# Patient Record
Sex: FEMALE | Race: WHITE | NOT HISPANIC OR LATINO | Employment: FULL TIME | URBAN - METROPOLITAN AREA
[De-identification: names, ages, dates, MRNs, and addresses within clinical notes are randomized per-mention and may not be internally consistent; named-entity substitution may affect disease eponyms.]

---

## 2017-07-21 ENCOUNTER — OFFICE VISIT (OUTPATIENT)
Dept: OBSTETRICS AND GYNECOLOGY | Facility: CLINIC | Age: 72
End: 2017-07-21
Payer: MEDICARE

## 2017-07-21 VITALS
BODY MASS INDEX: 25.82 KG/M2 | WEIGHT: 145.75 LBS | SYSTOLIC BLOOD PRESSURE: 106 MMHG | DIASTOLIC BLOOD PRESSURE: 68 MMHG | HEIGHT: 63 IN

## 2017-07-21 DIAGNOSIS — Z12.79 ENCOUNTER FOR SCREENING FOR MALIGNANT NEOPLASM OF OTHER GENITOURINARY ORGANS: Primary | ICD-10-CM

## 2017-07-21 PROCEDURE — G0101 CA SCREEN;PELVIC/BREAST EXAM: HCPCS | Mod: PBBFAC,PO | Performed by: OBSTETRICS & GYNECOLOGY

## 2017-07-21 PROCEDURE — 99999 PR PBB SHADOW E&M-EST. PATIENT-LVL III: CPT | Mod: PBBFAC,,, | Performed by: OBSTETRICS & GYNECOLOGY

## 2017-07-21 PROCEDURE — 88175 CYTOPATH C/V AUTO FLUID REDO: CPT

## 2017-07-21 PROCEDURE — 99213 OFFICE O/P EST LOW 20 MIN: CPT | Mod: PBBFAC,PO | Performed by: OBSTETRICS & GYNECOLOGY

## 2017-07-21 PROCEDURE — G0101 CA SCREEN;PELVIC/BREAST EXAM: HCPCS | Mod: S$PBB,,, | Performed by: OBSTETRICS & GYNECOLOGY

## 2017-07-21 NOTE — PROGRESS NOTES
"HPI:   71 y.o.   OB History      Para Term  AB Living    2 2 2          SAB TAB Ectopic Multiple Live Births                    No LMP recorded. Patient is postmenopausal.    Patient is  here for her annual gynecologic exam.  She has no complaints.     ROS:  GENERAL: No fever, chills, fatigability or weight loss.  SKIN: No rashes, itching or changes in color or texture of skin.  HEAD: No headaches or recent head trauma.  EYES: Visual acuity fine. No photophobia, ocular pain or diplopia.  EARS: Denies ear pain, discharge or vertigo.  NOSE: No loss of smell, no epistaxis or postnasal drip.  MOUTH & THROAT: No hoarseness or change in voice. No excessive gum bleeding.  NODES: Denies swollen glands.  CHEST: Denies FELIX, cyanosis, wheezing, cough and sputum production.  CARDIOVASCULAR: Denies chest pain, PND, orthopnea or reduced exercise tolerance.  ABDOMEN: Appetite fine. No weight loss. Denies diarrhea, abdominal pain, hematemesis or blood in stool.  URINARY: No flank pain, dysuria or hematuria.  PERIPHERAL VASCULAR: No claudication or cyanosis.  MUSCULOSKELETAL: No joint stiffness or swelling. Denies back pain.  NEUROLOGIC: No history of seizures, paralysis, alteration of gait or coordination.    PE:   /68   Ht 5' 3" (1.6 m)   Wt 66.1 kg (145 lb 11.6 oz)   BMI 25.81 kg/m²   APPEARANCE: Well nourished, well developed, in no acute distress.  NECK: Neck symmetric without masses or thyromegaly.  BREASTS: Symmetrical, no skin changes or visible lesions. No palpable masses, nipple discharge or adenopathy bilaterally.  ABDOMEN: Flat. Soft. No tenderness or masses. No hepatosplenomegaly. No hernias. No CVA tenderness.  VULVA: No lesions. Normal female genitalia.  URETHRAL MEATUS: Normal size and location, no lesions, no prolapse.  URETHRA: No masses, tenderness, prolapse or scarring.  VAGINA: Moist and well rugated, no discharge, no significant cystocele or rectocele.  CERVIX: No lesions and discharge. " PAP done.  UTERUS: Normal size, regular shape, mobile, non-tender, bladder base nontender.  ADNEXA: No masses, tenderness or CDS nodularity.  ANUS PERINEUM: Normal.    PROCEDURES:  Pap smear    Assessment:  Normal Gynecologic Exam    Plan:  Mammogram and Colonoscopy if indicated by current recommendations.  Return to clinic in one year or for any problems or complaints.  No co  Hx fo breast cancer and melanoma  Therefore high risk

## 2017-08-16 ENCOUNTER — OFFICE VISIT (OUTPATIENT)
Dept: DERMATOLOGY | Facility: CLINIC | Age: 72
End: 2017-08-16
Payer: MEDICARE

## 2017-08-16 DIAGNOSIS — Z85.820 PERSONAL HISTORY OF MALIGNANT MELANOMA OF SKIN: ICD-10-CM

## 2017-08-16 DIAGNOSIS — L73.8 SEBACEOUS HYPERPLASIA: ICD-10-CM

## 2017-08-16 DIAGNOSIS — L91.8 CUTANEOUS SKIN TAGS: ICD-10-CM

## 2017-08-16 DIAGNOSIS — L81.4 LENTIGINES: Primary | ICD-10-CM

## 2017-08-16 DIAGNOSIS — D18.00 ANGIOMA: ICD-10-CM

## 2017-08-16 DIAGNOSIS — D48.5 NEOPLASM OF UNCERTAIN BEHAVIOR OF SKIN: ICD-10-CM

## 2017-08-16 DIAGNOSIS — L82.1 SEBORRHEIC KERATOSES: ICD-10-CM

## 2017-08-16 PROCEDURE — 99213 OFFICE O/P EST LOW 20 MIN: CPT | Mod: PBBFAC,25 | Performed by: DERMATOLOGY

## 2017-08-16 PROCEDURE — 11100 PR BIOPSY OF SKIN LESION: CPT | Mod: S$PBB,,, | Performed by: DERMATOLOGY

## 2017-08-16 PROCEDURE — 1159F MED LIST DOCD IN RCRD: CPT | Mod: ,,, | Performed by: DERMATOLOGY

## 2017-08-16 PROCEDURE — 1126F AMNT PAIN NOTED NONE PRSNT: CPT | Mod: ,,, | Performed by: DERMATOLOGY

## 2017-08-16 PROCEDURE — 99203 OFFICE O/P NEW LOW 30 MIN: CPT | Mod: 25,S$PBB,, | Performed by: DERMATOLOGY

## 2017-08-16 PROCEDURE — 88342 IMHCHEM/IMCYTCHM 1ST ANTB: CPT | Mod: 26,,, | Performed by: PATHOLOGY

## 2017-08-16 PROCEDURE — 3008F BODY MASS INDEX DOCD: CPT | Mod: ,,, | Performed by: DERMATOLOGY

## 2017-08-16 PROCEDURE — 11100 PR BIOPSY OF SKIN LESION: CPT | Mod: PBBFAC | Performed by: DERMATOLOGY

## 2017-08-16 PROCEDURE — 88305 TISSUE EXAM BY PATHOLOGIST: CPT | Performed by: PATHOLOGY

## 2017-08-16 PROCEDURE — 99999 PR PBB SHADOW E&M-EST. PATIENT-LVL III: CPT | Mod: PBBFAC,,, | Performed by: DERMATOLOGY

## 2017-08-16 NOTE — PROGRESS NOTES
Subjective:       Patient ID:  Alyce Lewis is a 72 y.o. female who presents for   Chief Complaint   Patient presents with    Skin Check     TBSE, no concerns     HPI  71 yo F presents for skin check.  She noticed a new bump on her R arm.  She is unsure how long it has been present.  It has been stable in size, denies bleeding.  No prior treatments  Derm Hx: melanoma on R arm, LN negative (5 years ago in [place]);   Breast CA surgery 10 years ago; lumpectomy and XRT    Past Medical History:   Diagnosis Date    Breast cancer     Hyperlipidemia     Melanoma     on arm    Migraine    Social Hx: retired teacher    Review of Systems   Constitutional: Negative for fever, chills, weight loss, weight gain, fatigue and malaise.   Skin: Positive for activity-related sunscreen use. Negative for daily sunscreen use and recent sunburn.   Hematologic/Lymphatic: Does not bruise/bleed easily.        Objective:    Physical Exam   Constitutional: She appears well-developed and well-nourished. No distress.   Neurological: She is alert and oriented to person, place, and time. She is not disoriented.   Psychiatric: She has a normal mood and affect.   Skin:   Areas Examined (abnormalities noted in diagram):   Scalp / Hair Palpated and Inspected  Head / Face Inspection Performed  Neck Inspection Performed  Chest / Axilla Inspection Performed  Abdomen Inspection Performed  Genitals / Buttocks / Groin Inspection Performed  Back Inspection Performed  RUE Inspected  LUE Inspection Performed  RLE Inspected  LLE Inspection Performed  Nails and Digits Inspection Performed                       Diagram Legend     Erythematous scaling macule/papule c/w actinic keratosis       Vascular papule c/w angioma      Pigmented verrucoid papule/plaque c/w seborrheic keratosis      Yellow umbilicated papule c/w sebaceous hyperplasia      Irregularly shaped tan macule c/w lentigo     1-2 mm smooth white papules consistent with Milia      Movable  subcutaneous cyst with punctum c/w epidermal inclusion cyst      Subcutaneous movable cyst c/w pilar cyst      Firm pink to brown papule c/w dermatofibroma      Pedunculated fleshy papule(s) c/w skin tag(s)      Evenly pigmented macule c/w junctional nevus     Mildly variegated pigmented, slightly irregular-bordered macule c/w mildly atypical nevus      Flesh colored to evenly pigmented papule c/w intradermal nevus       Pink pearly papule/plaque c/w basal cell carcinoma      Erythematous hyperkeratotic cursted plaque c/w SCC      Surgical scar with no sign of skin cancer recurrence      Open and closed comedones      Inflammatory papules and pustules      Verrucoid papule consistent consistent with wart     Erythematous eczematous patches and plaques     Dystrophic onycholytic nail with subungual debris c/w onychomycosis     Umbilicated papule    Erythematous-base heme-crusted tan verrucoid plaque consistent with inflamed seborrheic keratosis     Erythematous Silvery Scaling Plaque c/w Psoriasis     See annotation      Assessment / Plan:      Pathology Orders:      Normal Orders This Visit    Tissue Specimen To Pathology, Dermatology     Questions:    Directional Terms:  Other(comment)    Clinical information:  r/o NMSC vs inflammatory papule vs irritated nevus vs other    Specific Site:  R upper arm        Lentigines  These are benign hyperpigmented sun induced lesions. Daily sun protection will reduce the number of new lesions    Angioma  This is a benign vascular lesion. Reassurance given. No treatment required.     Cutaneous skin tags  Reassurance    Neoplasm of uncertain behavior of skin  -     Tissue Specimen To Pathology, Dermatology  Shave biopsy procedure note:    Shave biopsy performed after verbal consent including risk of infection, scar, recurrence, need for additional treatment of site. Area prepped with alcohol, anesthetized with approximately 1.0cc of 1% lidocaine with epinephrine. Lesional tissue  shaved with razor blade. Hemostasis achieved with application of aluminum chloride. No complications. Dressing applied. Wound care explained.    Personal history of malignant melanoma of skin  Area of previous melanoma examined. Site well healed with no signs of recurrence.  Total body skin examination performed today including at least 12 points as noted in physical examination    Seborrheic keratoses  These are benign inherited growths without a malignant potential. Reassurance given to patient. No treatment is necessary.     Sebaceous hyperplasia  This is a common condition representing benign enlargement of the sebaceous lobule. It typically occurs in adulthood. Reassurance given to patient.              Return for pending Pathology.

## 2018-03-01 ENCOUNTER — OFFICE VISIT (OUTPATIENT)
Dept: INTERNAL MEDICINE | Facility: CLINIC | Age: 73
End: 2018-03-01
Payer: MEDICARE

## 2018-03-01 VITALS
RESPIRATION RATE: 15 BRPM | SYSTOLIC BLOOD PRESSURE: 124 MMHG | WEIGHT: 149.94 LBS | TEMPERATURE: 99 F | HEART RATE: 90 BPM | DIASTOLIC BLOOD PRESSURE: 76 MMHG | BODY MASS INDEX: 26.56 KG/M2

## 2018-03-01 DIAGNOSIS — Z23 NEED FOR SHINGLES VACCINE: ICD-10-CM

## 2018-03-01 DIAGNOSIS — N32.81 OVERACTIVE BLADDER: ICD-10-CM

## 2018-03-01 DIAGNOSIS — E78.5 HYPERLIPIDEMIA, UNSPECIFIED HYPERLIPIDEMIA TYPE: Primary | ICD-10-CM

## 2018-03-01 PROCEDURE — 99204 OFFICE O/P NEW MOD 45 MIN: CPT | Mod: S$PBB,,, | Performed by: INTERNAL MEDICINE

## 2018-03-01 PROCEDURE — 99999 PR PBB SHADOW E&M-EST. PATIENT-LVL III: CPT | Mod: PBBFAC,,, | Performed by: INTERNAL MEDICINE

## 2018-03-01 PROCEDURE — 99213 OFFICE O/P EST LOW 20 MIN: CPT | Mod: PBBFAC,PO | Performed by: INTERNAL MEDICINE

## 2018-03-01 RX ORDER — METOPROLOL SUCCINATE 25 MG/1
25 TABLET, EXTENDED RELEASE ORAL DAILY
Qty: 30 TABLET | Refills: 11 | Status: SHIPPED | OUTPATIENT
Start: 2018-03-01 | End: 2019-02-24 | Stop reason: SDUPTHER

## 2018-03-01 NOTE — PROGRESS NOTES
Subjective:       Patient ID: Alyce Lewis is a 72 y.o. female.    Chief Complaint: Annual Exam and Establish Care    Patient is a 72 y.o.female who presents today for establish care.      Cholesterol: due now  Vaccines: pneumonia shot up to date; flu shot up to date  Mammogram: up to date; usually   Gyn exam: dr. easley  Colonoscopy: had one in the last 1-2 years  DEXA: normal     Past Medical History:   Diagnosis Date    Breast cancer     2010; lumpectomy and radiation; dr. condon    Hyperlipidemia     Melanoma     on arm; right arm; dr. jennie nolasco    Migraine     uses maxalt disintegrating;      Past Surgical History:   Procedure Laterality Date    BREAST LUMPECTOMY      carpel tunnel release      HAND SURGERY      melanoma removal      right arm    TONSILLECTOMY       Social History     Social History    Marital status:      Spouse name: N/A    Number of children: N/A    Years of education: N/A     Occupational History    Not on file.     Social History Main Topics    Smoking status: Never Smoker    Smokeless tobacco: Never Used    Alcohol use No    Drug use: No    Sexual activity: Not on file     Other Topics Concern    Not on file     Social History Narrative    No narrative on file     Review of patient's allergies indicates:   Allergen Reactions    Penicillins Hives    Demerol [meperidine] Nausea And Vomiting    Sulfa (sulfonamide antibiotics) Hives     Ms. Lewis had no medications administered during this visit.    Review of Systems   Constitutional: Negative for appetite change, chills, diaphoresis, fatigue and fever.   HENT: Negative for congestion, dental problem, ear discharge, ear pain, hearing loss, postnasal drip, sinus pressure and sore throat.    Eyes: Negative for discharge, redness and itching.   Respiratory: Negative for cough, chest tightness, shortness of breath and wheezing.    Cardiovascular: Negative for chest pain, palpitations and leg swelling.    Gastrointestinal: Negative for abdominal pain, constipation, diarrhea, nausea and vomiting.   Endocrine: Negative for cold intolerance and heat intolerance.   Genitourinary: Negative for difficulty urinating, frequency, hematuria and urgency.   Musculoskeletal: Negative for arthralgias, back pain, gait problem, myalgias and neck pain.   Skin: Negative for color change and rash.   Neurological: Negative for dizziness, syncope and headaches.   Hematological: Negative for adenopathy.   Psychiatric/Behavioral: Negative for behavioral problems and sleep disturbance. The patient is not nervous/anxious.        Objective:      Physical Exam   Constitutional: She is oriented to person, place, and time. She appears well-developed and well-nourished. No distress.   HENT:   Head: Normocephalic and atraumatic.   Right Ear: External ear normal.   Left Ear: External ear normal.   Nose: Nose normal.   Mouth/Throat: Oropharynx is clear and moist. No oropharyngeal exudate.   Eyes: Conjunctivae and EOM are normal. Pupils are equal, round, and reactive to light. Right eye exhibits no discharge. Left eye exhibits no discharge. No scleral icterus.   Neck: Normal range of motion. Neck supple. No JVD present. No thyromegaly present.   Cardiovascular: Normal rate, regular rhythm, normal heart sounds and intact distal pulses.  Exam reveals no gallop and no friction rub.    No murmur heard.  Pulmonary/Chest: Effort normal and breath sounds normal. No stridor. No respiratory distress. She has no wheezes. She has no rales. She exhibits no tenderness.   Abdominal: Soft. Bowel sounds are normal. She exhibits no distension. There is no tenderness. There is no rebound.   Musculoskeletal: Normal range of motion. She exhibits no edema or tenderness.   Lymphadenopathy:     She has no cervical adenopathy.   Neurological: She is alert and oriented to person, place, and time. No cranial nerve deficit.   Skin: Skin is warm and dry. No rash noted. She is  not diaphoretic. No erythema.   Psychiatric: She has a normal mood and affect. Her behavior is normal.   Nursing note and vitals reviewed.      Assessment and Plan:       1. Hyperlipidemia, unspecified hyperlipidemia type  - CBC auto differential; Future  - Comprehensive metabolic panel; Future  - Lipid panel; Future  - TSH; Future  - Urinalysis; Future    2. Overactive bladder  - stable  - CBC auto differential; Future  - Comprehensive metabolic panel; Future  - Lipid panel; Future  - TSH; Future  - Urinalysis; Future    3. Need for shingles vaccine    - zoster vaccine live, PF, (ZOSTAVAX) 19,400 unit/0.65 mL injection; Inject 19,400 Units into the skin once. Zostavax x 1  v70.0  Dispense: 1 vial; Refill: 0  - CBC auto differential; Future  - Comprehensive metabolic panel; Future  - Lipid panel; Future  - TSH; Future  - Urinalysis; Future          No Follow-up on file.

## 2018-03-03 ENCOUNTER — LAB VISIT (OUTPATIENT)
Dept: LAB | Facility: HOSPITAL | Age: 73
End: 2018-03-03
Attending: INTERNAL MEDICINE
Payer: MEDICARE

## 2018-03-03 DIAGNOSIS — N32.81 OVERACTIVE BLADDER: ICD-10-CM

## 2018-03-03 DIAGNOSIS — E78.5 HYPERLIPIDEMIA, UNSPECIFIED HYPERLIPIDEMIA TYPE: ICD-10-CM

## 2018-03-03 DIAGNOSIS — Z23 NEED FOR SHINGLES VACCINE: ICD-10-CM

## 2018-03-03 LAB
ALBUMIN SERPL BCP-MCNC: 3.7 G/DL
ALP SERPL-CCNC: 59 U/L
ALT SERPL W/O P-5'-P-CCNC: 14 U/L
ANION GAP SERPL CALC-SCNC: 8 MMOL/L
AST SERPL-CCNC: 16 U/L
BASOPHILS # BLD AUTO: 0.06 K/UL
BASOPHILS NFR BLD: 1.2 %
BILIRUB SERPL-MCNC: 0.3 MG/DL
BUN SERPL-MCNC: 12 MG/DL
CALCIUM SERPL-MCNC: 9.6 MG/DL
CHLORIDE SERPL-SCNC: 105 MMOL/L
CHOLEST SERPL-MCNC: 181 MG/DL
CHOLEST/HDLC SERPL: 3.3 {RATIO}
CO2 SERPL-SCNC: 26 MMOL/L
CREAT SERPL-MCNC: 0.7 MG/DL
DIFFERENTIAL METHOD: ABNORMAL
EOSINOPHIL # BLD AUTO: 0.2 K/UL
EOSINOPHIL NFR BLD: 4.4 %
ERYTHROCYTE [DISTWIDTH] IN BLOOD BY AUTOMATED COUNT: 13.1 %
EST. GFR  (AFRICAN AMERICAN): >60 ML/MIN/1.73 M^2
EST. GFR  (NON AFRICAN AMERICAN): >60 ML/MIN/1.73 M^2
GLUCOSE SERPL-MCNC: 91 MG/DL
HCT VFR BLD AUTO: 38.9 %
HDLC SERPL-MCNC: 55 MG/DL
HDLC SERPL: 30.4 %
HGB BLD-MCNC: 12.4 G/DL
IMM GRANULOCYTES # BLD AUTO: 0.01 K/UL
IMM GRANULOCYTES NFR BLD AUTO: 0.2 %
LDLC SERPL CALC-MCNC: 112.2 MG/DL
LYMPHOCYTES # BLD AUTO: 1.4 K/UL
LYMPHOCYTES NFR BLD: 28.3 %
MCH RBC QN AUTO: 29.5 PG
MCHC RBC AUTO-ENTMCNC: 31.9 G/DL
MCV RBC AUTO: 93 FL
MONOCYTES # BLD AUTO: 0.5 K/UL
MONOCYTES NFR BLD: 10.3 %
NEUTROPHILS # BLD AUTO: 2.8 K/UL
NEUTROPHILS NFR BLD: 55.6 %
NONHDLC SERPL-MCNC: 126 MG/DL
NRBC BLD-RTO: 0 /100 WBC
PLATELET # BLD AUTO: 243 K/UL
PMV BLD AUTO: 10.8 FL
POTASSIUM SERPL-SCNC: 4 MMOL/L
PROT SERPL-MCNC: 6.8 G/DL
RBC # BLD AUTO: 4.2 M/UL
SODIUM SERPL-SCNC: 139 MMOL/L
TRIGL SERPL-MCNC: 69 MG/DL
TSH SERPL DL<=0.005 MIU/L-ACNC: 1 UIU/ML
WBC # BLD AUTO: 5.05 K/UL

## 2018-03-03 PROCEDURE — 80061 LIPID PANEL: CPT

## 2018-03-03 PROCEDURE — 36415 COLL VENOUS BLD VENIPUNCTURE: CPT | Mod: PO

## 2018-03-03 PROCEDURE — 84443 ASSAY THYROID STIM HORMONE: CPT

## 2018-03-03 PROCEDURE — 80053 COMPREHEN METABOLIC PANEL: CPT

## 2018-03-03 PROCEDURE — 85025 COMPLETE CBC W/AUTO DIFF WBC: CPT

## 2018-05-06 RX ORDER — SIMVASTATIN 40 MG/1
TABLET, FILM COATED ORAL
Qty: 30 TABLET | Refills: 0 | Status: SHIPPED | OUTPATIENT
Start: 2018-05-06 | End: 2018-06-01 | Stop reason: SDUPTHER

## 2018-05-06 RX ORDER — SIMVASTATIN 40 MG/1
TABLET, FILM COATED ORAL
Qty: 10 TABLET | Refills: 0 | Status: SHIPPED | OUTPATIENT
Start: 2018-05-06 | End: 2018-05-06 | Stop reason: SDUPTHER

## 2018-06-01 RX ORDER — RIZATRIPTAN BENZOATE 10 MG/1
TABLET, ORALLY DISINTEGRATING ORAL
Qty: 15 TABLET | Refills: 0 | Status: SHIPPED | OUTPATIENT
Start: 2018-06-01 | End: 2018-06-14 | Stop reason: SDUPTHER

## 2018-06-01 RX ORDER — CYCLOBENZAPRINE HCL 10 MG
TABLET ORAL
Qty: 10 TABLET | Refills: 0 | Status: SHIPPED | OUTPATIENT
Start: 2018-06-01 | End: 2018-07-25 | Stop reason: SDUPTHER

## 2018-06-01 RX ORDER — SIMVASTATIN 40 MG/1
TABLET, FILM COATED ORAL
Qty: 30 TABLET | Refills: 0 | Status: SHIPPED | OUTPATIENT
Start: 2018-06-01 | End: 2018-06-14 | Stop reason: SDUPTHER

## 2018-06-01 RX ORDER — BUSPIRONE HYDROCHLORIDE 15 MG/1
TABLET ORAL
Qty: 60 TABLET | Refills: 0 | Status: SHIPPED | OUTPATIENT
Start: 2018-06-01 | End: 2018-06-14

## 2018-06-01 RX ORDER — BUSPIRONE HYDROCHLORIDE 15 MG/1
TABLET ORAL
Qty: 60 TABLET | Refills: 0 | Status: SHIPPED | OUTPATIENT
Start: 2018-06-01 | End: 2018-06-14 | Stop reason: SDUPTHER

## 2018-06-08 DIAGNOSIS — Z12.11 COLON CANCER SCREENING: ICD-10-CM

## 2018-06-08 DIAGNOSIS — Z12.39 BREAST CANCER SCREENING: ICD-10-CM

## 2018-06-14 ENCOUNTER — OFFICE VISIT (OUTPATIENT)
Dept: INTERNAL MEDICINE | Facility: CLINIC | Age: 73
End: 2018-06-14
Payer: MEDICARE

## 2018-06-14 VITALS
BODY MASS INDEX: 26.95 KG/M2 | WEIGHT: 152.13 LBS | HEART RATE: 109 BPM | HEIGHT: 63 IN | TEMPERATURE: 97 F | DIASTOLIC BLOOD PRESSURE: 73 MMHG | RESPIRATION RATE: 14 BRPM | SYSTOLIC BLOOD PRESSURE: 113 MMHG

## 2018-06-14 DIAGNOSIS — F41.9 ANXIETY: Chronic | ICD-10-CM

## 2018-06-14 DIAGNOSIS — G43.909 MIGRAINE SYNDROME: ICD-10-CM

## 2018-06-14 DIAGNOSIS — E78.5 HYPERLIPIDEMIA, UNSPECIFIED HYPERLIPIDEMIA TYPE: ICD-10-CM

## 2018-06-14 DIAGNOSIS — I10 ESSENTIAL HYPERTENSION: Primary | ICD-10-CM

## 2018-06-14 PROCEDURE — 99214 OFFICE O/P EST MOD 30 MIN: CPT | Mod: S$PBB,,, | Performed by: INTERNAL MEDICINE

## 2018-06-14 PROCEDURE — 99213 OFFICE O/P EST LOW 20 MIN: CPT | Mod: PBBFAC,PO | Performed by: INTERNAL MEDICINE

## 2018-06-14 PROCEDURE — 99999 PR PBB SHADOW E&M-EST. PATIENT-LVL III: CPT | Mod: PBBFAC,,, | Performed by: INTERNAL MEDICINE

## 2018-06-14 RX ORDER — RIZATRIPTAN BENZOATE 10 MG/1
TABLET, ORALLY DISINTEGRATING ORAL
Qty: 15 TABLET | Refills: 3 | Status: SHIPPED | OUTPATIENT
Start: 2018-06-14 | End: 2018-06-14 | Stop reason: SDUPTHER

## 2018-06-14 RX ORDER — SIMVASTATIN 40 MG/1
TABLET, FILM COATED ORAL
Qty: 30 TABLET | Refills: 3 | Status: SHIPPED | OUTPATIENT
Start: 2018-06-14 | End: 2018-07-18 | Stop reason: SDUPTHER

## 2018-06-14 RX ORDER — BUSPIRONE HYDROCHLORIDE 15 MG/1
15 TABLET ORAL 2 TIMES DAILY
Qty: 60 TABLET | Refills: 3 | Status: SHIPPED | OUTPATIENT
Start: 2018-06-14 | End: 2018-07-10

## 2018-06-14 RX ORDER — RIZATRIPTAN BENZOATE 10 MG/1
TABLET, ORALLY DISINTEGRATING ORAL
Qty: 15 TABLET | Refills: 3 | Status: SHIPPED | OUTPATIENT
Start: 2018-06-14 | End: 2018-07-25 | Stop reason: SDUPTHER

## 2018-06-14 NOTE — PROGRESS NOTES
Subjective:       Patient ID: Alyce Lewis is a 72 y.o. female.    Chief Complaint: Follow-up    HPI     72-year-old female here for follow-up of chronic medical conditions.    HTN -  Patient is currently on Toprol-XL 25 mg. She does not check her BP at home. Side effects of medications note: none. Denies headaches, blurred vision, chest pain, shortness of breath, nausea.    HLD - Patient is currently on Zocor 40 mg.  Her last lipid panel was   Cholesterol   Date Value Ref Range Status   03/03/2018 181 120 - 199 mg/dL Final     Comment:     The National Cholesterol Education Program (NCEP) has set the  following guidelines (reference ranges) for Cholesterol:  Optimal.....................<200 mg/dL  Borderline High.............200-239 mg/dL  High........................> or = 240 mg/dL       Triglycerides   Date Value Ref Range Status   03/03/2018 69 30 - 150 mg/dL Final     Comment:     The National Cholesterol Education Program (NCEP) has set the  following guidelines (reference values) for triglycerides:  Normal......................<150 mg/dL  Borderline High.............150-199 mg/dL  High........................200-499 mg/dL       HDL   Date Value Ref Range Status   03/03/2018 55 40 - 75 mg/dL Final     Comment:     The National Cholesterol Education Program (NCEP) has set the  following guidelines (reference values) for HDL Cholesterol:  Low...............<40 mg/dL  Optimal...........>60 mg/dL       LDL Cholesterol   Date Value Ref Range Status   03/03/2018 112.2 63.0 - 159.0 mg/dL Final     Comment:     The National Cholesterol Education Program (NCEP) has set the  following guidelines (reference values) for LDL Cholesterol:  Optimal.......................<130 mg/dL  Borderline High...............130-159 mg/dL  High..........................160-189 mg/dL  Very High.....................>190 mg/dL     .  Side effects of the medication: none.    Anxiety - on BuSpar 15 mg b.i.d., Zoloft 100 mg daily.      She  has migraines.  She has been having migraines since 18 yo.  These are not as bad as she was when she was 18 yo.  She reports that with the maxalt, the headaches resolve in 20 minutes instead of three days.  She has used imitrex in the past.  She has used injectable imitrex.  The imitrex stopped working for a while.  She has taken fioricet, but was dependent on this, so stopped it. She has tried other medications that were ineffective.    Review of Systems    Objective:      Physical Exam   Constitutional: She is oriented to person, place, and time. She appears well-developed and well-nourished.   HENT:   Head: Normocephalic and atraumatic.   Mouth/Throat: No oropharyngeal exudate.   Eyes: EOM are normal. Pupils are equal, round, and reactive to light. Right eye exhibits no discharge. Left eye exhibits no discharge. No scleral icterus.   Neck: Normal range of motion. Neck supple. No tracheal deviation present. No thyromegaly present.   Cardiovascular: Normal rate, regular rhythm and normal heart sounds.  Exam reveals no gallop and no friction rub.    No murmur heard.  Pulmonary/Chest: Effort normal and breath sounds normal. No respiratory distress. She has no wheezes. She has no rales. She exhibits no tenderness.   Abdominal: Soft. Bowel sounds are normal. She exhibits no distension and no mass. There is no tenderness. There is no rebound and no guarding.   Musculoskeletal: Normal range of motion. She exhibits no edema or tenderness.   Neurological: She is alert and oriented to person, place, and time.   Skin: Skin is warm and dry. No rash noted. No erythema. No pallor.   Psychiatric: She has a normal mood and affect. Her behavior is normal.   Vitals reviewed.      Assessment:       1. Essential hypertension    2. Hyperlipidemia, unspecified hyperlipidemia type    3. Anxiety    4. Migraine syndrome        Plan:       1.  Continue Toprol-XL 25 mg.  2.  Continue Zocor 40 mg.  3.  Continue BuSpar 15 mg b.i.d., Zoloft  100 mg daily.  4.  Continue Maxalt 10 mg as needed.

## 2018-07-10 RX ORDER — BUSPIRONE HYDROCHLORIDE 15 MG/1
TABLET ORAL
Qty: 60 TABLET | Refills: 6 | Status: SHIPPED | OUTPATIENT
Start: 2018-07-10 | End: 2018-07-25 | Stop reason: SDUPTHER

## 2018-07-18 RX ORDER — SIMVASTATIN 40 MG/1
TABLET, FILM COATED ORAL
Qty: 30 TABLET | Refills: 5 | Status: SHIPPED | OUTPATIENT
Start: 2018-07-18 | End: 2019-01-12 | Stop reason: SDUPTHER

## 2018-07-18 RX ORDER — SIMVASTATIN 40 MG/1
TABLET, FILM COATED ORAL
Qty: 30 TABLET | Refills: 0 | OUTPATIENT
Start: 2018-07-18

## 2018-07-24 NOTE — PROGRESS NOTES
Subjective:       Patient ID: Alyce Lewis is a 72 y.o. female.    Chief Complaint: Follow-up (medication)    Patient is a 72 y.o.female who presents today for follow up.      Vaccines: pneumonia shot up to date; flu shot up to date  Mammogram:  Due in august; ordered by gyn  Gyn exam: dr. easley  Colonoscopy: had one in the last 2 years  DEXA: normal       Migraines: well controlled with rizatriptan; tried imitrex in the past but it stopped working. Same thing occurred with fioricet. She tried depakote in the past as well but it never worked well either.     Flexeril: pt is taking it once per day for her muscle aches; she has a disc bulge in her neck. The flexeril prevents her from getting trapezius muscle spasms for the disc bulge.   Review of Systems   Constitutional: Negative for appetite change, chills, diaphoresis, fatigue and fever.   HENT: Negative for congestion, dental problem, ear discharge, ear pain, hearing loss, postnasal drip, sinus pressure and sore throat.    Eyes: Negative for discharge, redness and itching.   Respiratory: Negative for cough, chest tightness, shortness of breath and wheezing.    Cardiovascular: Negative for chest pain, palpitations and leg swelling.   Gastrointestinal: Negative for abdominal pain, constipation, diarrhea, nausea and vomiting.   Endocrine: Negative for cold intolerance and heat intolerance.   Genitourinary: Negative for difficulty urinating, frequency, hematuria and urgency.   Musculoskeletal: Negative for arthralgias, back pain, gait problem, myalgias and neck pain.   Skin: Negative for color change and rash.   Neurological: Negative for dizziness, syncope and headaches.   Hematological: Negative for adenopathy.   Psychiatric/Behavioral: Negative for behavioral problems and sleep disturbance. The patient is not nervous/anxious.        Objective:      Physical Exam   Constitutional: She is oriented to person, place, and time. She appears well-developed and  well-nourished. No distress.   HENT:   Head: Normocephalic and atraumatic.   Right Ear: External ear normal.   Left Ear: External ear normal.   Nose: Nose normal.   Mouth/Throat: Oropharynx is clear and moist. No oropharyngeal exudate.   Eyes: Conjunctivae and EOM are normal. Pupils are equal, round, and reactive to light. Right eye exhibits no discharge. Left eye exhibits no discharge. No scleral icterus.   Neck: Normal range of motion. Neck supple. No JVD present. No thyromegaly present.   Cardiovascular: Normal rate, regular rhythm, normal heart sounds and intact distal pulses.  Exam reveals no gallop and no friction rub.    No murmur heard.  Pulmonary/Chest: Effort normal and breath sounds normal. No stridor. No respiratory distress. She has no wheezes. She has no rales. She exhibits no tenderness.   Abdominal: Soft. Bowel sounds are normal. She exhibits no distension. There is no tenderness. There is no rebound.   Musculoskeletal: Normal range of motion. She exhibits no edema or tenderness.   Lymphadenopathy:     She has no cervical adenopathy.   Neurological: She is alert and oriented to person, place, and time. No cranial nerve deficit.   Skin: Skin is warm and dry. No rash noted. She is not diaphoretic. No erythema.   Psychiatric: She has a normal mood and affect. Her behavior is normal.   Nursing note and vitals reviewed.      Assessment and Plan:       1. Hyperlipidemia, unspecified hyperlipidemia type  - stable    2. Essential hypertension  - good control on meds    3. Anxiety  - stable    4. Migraine syndrome  - maxalt refilled    5. Neck pain: continue flexeril prn        No Follow-up on file.

## 2018-07-25 ENCOUNTER — OFFICE VISIT (OUTPATIENT)
Dept: INTERNAL MEDICINE | Facility: CLINIC | Age: 73
End: 2018-07-25
Payer: MEDICARE

## 2018-07-25 VITALS
WEIGHT: 152.75 LBS | BODY MASS INDEX: 27.06 KG/M2 | DIASTOLIC BLOOD PRESSURE: 80 MMHG | HEART RATE: 90 BPM | TEMPERATURE: 99 F | RESPIRATION RATE: 15 BRPM | SYSTOLIC BLOOD PRESSURE: 114 MMHG

## 2018-07-25 DIAGNOSIS — G43.909 MIGRAINE SYNDROME: ICD-10-CM

## 2018-07-25 DIAGNOSIS — M54.2 NECK PAIN: ICD-10-CM

## 2018-07-25 DIAGNOSIS — I10 ESSENTIAL HYPERTENSION: Chronic | ICD-10-CM

## 2018-07-25 DIAGNOSIS — F41.9 ANXIETY: Chronic | ICD-10-CM

## 2018-07-25 DIAGNOSIS — E78.5 HYPERLIPIDEMIA, UNSPECIFIED HYPERLIPIDEMIA TYPE: Primary | Chronic | ICD-10-CM

## 2018-07-25 PROCEDURE — 99214 OFFICE O/P EST MOD 30 MIN: CPT | Mod: S$PBB,,, | Performed by: INTERNAL MEDICINE

## 2018-07-25 PROCEDURE — 99213 OFFICE O/P EST LOW 20 MIN: CPT | Mod: PBBFAC,PO | Performed by: INTERNAL MEDICINE

## 2018-07-25 PROCEDURE — 99999 PR PBB SHADOW E&M-EST. PATIENT-LVL III: CPT | Mod: PBBFAC,,, | Performed by: INTERNAL MEDICINE

## 2018-07-25 RX ORDER — CYCLOBENZAPRINE HCL 10 MG
TABLET ORAL
Qty: 30 TABLET | Refills: 6 | Status: SHIPPED | OUTPATIENT
Start: 2018-07-25 | End: 2019-01-27 | Stop reason: SDUPTHER

## 2018-07-25 RX ORDER — BUSPIRONE HYDROCHLORIDE 15 MG/1
TABLET ORAL
Qty: 60 TABLET | Refills: 6 | Status: SHIPPED | OUTPATIENT
Start: 2018-07-25 | End: 2019-08-05 | Stop reason: SDUPTHER

## 2018-07-25 RX ORDER — RIZATRIPTAN BENZOATE 10 MG/1
TABLET, ORALLY DISINTEGRATING ORAL
Qty: 15 TABLET | Refills: 11 | Status: SHIPPED | OUTPATIENT
Start: 2018-07-25 | End: 2019-07-27 | Stop reason: SDUPTHER

## 2018-07-31 ENCOUNTER — TELEPHONE (OUTPATIENT)
Dept: OBSTETRICS AND GYNECOLOGY | Facility: CLINIC | Age: 73
End: 2018-07-31

## 2018-07-31 NOTE — TELEPHONE ENCOUNTER
----- Message from Alis Chung sent at 7/31/2018  9:28 AM CDT -----  Contact: Self/ [phone]  Patient requesting orders for a mammogram. Patient would like [facility] to [facility].     Fax# [phone]    Please call and advise.

## 2018-08-09 RX ORDER — OXYBUTYNIN CHLORIDE 10 MG/1
TABLET, EXTENDED RELEASE ORAL
Qty: 30 TABLET | Refills: 11 | Status: SHIPPED | OUTPATIENT
Start: 2018-08-09 | End: 2019-09-02 | Stop reason: SDUPTHER

## 2018-08-09 RX ORDER — FLUTICASONE PROPIONATE 50 MCG
SPRAY, SUSPENSION (ML) NASAL
Qty: 16 ML | Refills: 11 | Status: SHIPPED | OUTPATIENT
Start: 2018-08-09 | End: 2020-04-17 | Stop reason: SDUPTHER

## 2018-08-15 ENCOUNTER — TELEPHONE (OUTPATIENT)
Dept: OTOLARYNGOLOGY | Facility: CLINIC | Age: 73
End: 2018-08-15

## 2018-08-15 ENCOUNTER — PATIENT MESSAGE (OUTPATIENT)
Dept: OBSTETRICS AND GYNECOLOGY | Facility: CLINIC | Age: 73
End: 2018-08-15

## 2018-08-15 NOTE — TELEPHONE ENCOUNTER
----- Message from Terrence Jose sent at 8/15/2018 10:39 AM CDT -----  Contact: [phone]  Patient Requesting Sooner Appointment.     Reason for sooner appt.: double ear blockage  When is the first available appointment? 10/5  Communication Preference: [phone]  Additional Information:

## 2018-08-16 ENCOUNTER — PATIENT MESSAGE (OUTPATIENT)
Dept: OBSTETRICS AND GYNECOLOGY | Facility: CLINIC | Age: 73
End: 2018-08-16

## 2018-08-20 ENCOUNTER — OFFICE VISIT (OUTPATIENT)
Dept: OBSTETRICS AND GYNECOLOGY | Facility: CLINIC | Age: 73
End: 2018-08-20
Payer: MEDICARE

## 2018-08-20 ENCOUNTER — TELEPHONE (OUTPATIENT)
Dept: INTERNAL MEDICINE | Facility: CLINIC | Age: 73
End: 2018-08-20

## 2018-08-20 VITALS
HEIGHT: 63 IN | WEIGHT: 150.38 LBS | DIASTOLIC BLOOD PRESSURE: 78 MMHG | BODY MASS INDEX: 26.64 KG/M2 | SYSTOLIC BLOOD PRESSURE: 122 MMHG

## 2018-08-20 DIAGNOSIS — Z12.4 ROUTINE PAPANICOLAOU SMEAR: ICD-10-CM

## 2018-08-20 DIAGNOSIS — Z01.419 WELL WOMAN EXAM WITH ROUTINE GYNECOLOGICAL EXAM: Primary | ICD-10-CM

## 2018-08-20 PROCEDURE — 88175 CYTOPATH C/V AUTO FLUID REDO: CPT

## 2018-08-20 PROCEDURE — 99213 OFFICE O/P EST LOW 20 MIN: CPT | Mod: PBBFAC,PO,25 | Performed by: OBSTETRICS & GYNECOLOGY

## 2018-08-20 PROCEDURE — G0101 CA SCREEN;PELVIC/BREAST EXAM: HCPCS | Mod: S$PBB,,, | Performed by: OBSTETRICS & GYNECOLOGY

## 2018-08-20 PROCEDURE — G0101 CA SCREEN;PELVIC/BREAST EXAM: HCPCS | Mod: PBBFAC,PO | Performed by: OBSTETRICS & GYNECOLOGY

## 2018-08-20 PROCEDURE — 99999 PR PBB SHADOW E&M-EST. PATIENT-LVL III: CPT | Mod: PBBFAC,,, | Performed by: OBSTETRICS & GYNECOLOGY

## 2018-08-20 NOTE — TELEPHONE ENCOUNTER
----- Message from Laly Agudelo sent at 8/20/2018  2:30 PM CDT -----  Contact: Kendal/Maliha/[phone]  Kendal would like to get a prior authorization for script cyclobenzaprine (FLEXERIL) 10 MG tablet, Kendal will fax over the prior authorization form.

## 2018-08-20 NOTE — PROGRESS NOTES
"HPI:   73 y.o.   OB History      Para Term  AB Living    2 2 2          SAB TAB Ectopic Multiple Live Births                    No LMP recorded. Patient is postmenopausal.    Patient is  here for her annual gynecologic exam.  She has no complaints.     ROS:  GENERAL: No fever, chills, fatigability or weight loss.  SKIN: No rashes, itching or changes in color or texture of skin.  HEAD: No headaches or recent head trauma.  EYES: Visual acuity fine. No photophobia, ocular pain or diplopia.  EARS: Denies ear pain, discharge or vertigo.  NOSE: No loss of smell, no epistaxis or postnasal drip.  MOUTH & THROAT: No hoarseness or change in voice. No excessive gum bleeding.  NODES: Denies swollen glands.  CHEST: Denies FELIX, cyanosis, wheezing, cough and sputum production.  CARDIOVASCULAR: Denies chest pain, PND, orthopnea or reduced exercise tolerance.  ABDOMEN: Appetite fine. No weight loss. Denies diarrhea, abdominal pain, hematemesis or blood in stool.  URINARY: No flank pain, dysuria or hematuria.  PERIPHERAL VASCULAR: No claudication or cyanosis.  MUSCULOSKELETAL: No joint stiffness or swelling. Denies back pain.  NEUROLOGIC: No history of seizures, paralysis, alteration of gait or coordination.    PE:   /78   Ht 5' 3" (1.6 m)   Wt 68.2 kg (150 lb 5.7 oz)   BMI 26.63 kg/m²   APPEARANCE: Well nourished, well developed, in no acute distress.  NECK: Neck symmetric without masses or thyromegaly.  BREASTS: Symmetrical, no skin changes or visible lesions. No palpable masses, nipple discharge or adenopathy bilaterally.  ABDOMEN: Flat. Soft. No tenderness or masses. No hepatosplenomegaly. No hernias. No CVA tenderness.  VULVA: No lesions. Normal female genitalia.  URETHRAL MEATUS: Normal size and location, no lesions, no prolapse.  URETHRA: No masses, tenderness, prolapse or scarring.  VAGINA: Moist and well rugated, no discharge, no significant cystocele or rectocele.  CERVIX: No lesions and discharge. " PAP done.  UTERUS: Normal size, regular shape, mobile, non-tender, bladder base nontender.  ADNEXA: No masses, tenderness or CDS nodularity.  ANUS PERINEUM: Normal.    PROCEDURES:  Pap smear    Assessment:  Normal Gynecologic Exam    Plan:  Mammogram and Colonoscopy if indicated by current recommendations.  Return to clinic in one year or for any problems or complaints.no co

## 2018-10-02 ENCOUNTER — PATIENT MESSAGE (OUTPATIENT)
Dept: INTERNAL MEDICINE | Facility: CLINIC | Age: 73
End: 2018-10-02

## 2018-10-02 NOTE — TELEPHONE ENCOUNTER
"There is no "cancer" gene unfortunately. We can test for brca gene which predisposes for breast and uterine cancer but that is the only marker we have  "

## 2018-10-23 ENCOUNTER — OFFICE VISIT (OUTPATIENT)
Dept: INTERNAL MEDICINE | Facility: CLINIC | Age: 73
End: 2018-10-23
Payer: MEDICARE

## 2018-10-23 VITALS
RESPIRATION RATE: 14 BRPM | WEIGHT: 151.25 LBS | BODY MASS INDEX: 26.8 KG/M2 | HEART RATE: 104 BPM | DIASTOLIC BLOOD PRESSURE: 67 MMHG | SYSTOLIC BLOOD PRESSURE: 131 MMHG | TEMPERATURE: 100 F | HEIGHT: 63 IN

## 2018-10-23 DIAGNOSIS — R19.5 LOOSE STOOLS: Primary | ICD-10-CM

## 2018-10-23 DIAGNOSIS — K59.00 CONSTIPATION, UNSPECIFIED CONSTIPATION TYPE: ICD-10-CM

## 2018-10-23 PROCEDURE — 99213 OFFICE O/P EST LOW 20 MIN: CPT | Mod: S$PBB,,, | Performed by: INTERNAL MEDICINE

## 2018-10-23 PROCEDURE — 99213 OFFICE O/P EST LOW 20 MIN: CPT | Mod: PBBFAC,PO | Performed by: INTERNAL MEDICINE

## 2018-10-23 PROCEDURE — 99999 PR PBB SHADOW E&M-EST. PATIENT-LVL III: CPT | Mod: PBBFAC,,, | Performed by: INTERNAL MEDICINE

## 2018-10-23 NOTE — PROGRESS NOTES
Subjective:       Patient ID: Alyce Lewis is a 73 y.o. female.    Chief Complaint: Diarrhea    HPI     73-year-old female here for evaluation of diarrhea.  She is usually regular because of her diet.  She has a very soft stool.  She has trouble having a bowel movent.  The stool is not loose and watery.  This has been going on the last three weeks.  She has been going back and forth from constipation to loose stools over the last 3 weeks.  She has not changed her diet recently.  She works at a  three days a week.  She had breast cancer and melanoma on her arm.    Review of Systems    Objective:      Physical Exam   Constitutional: She is oriented to person, place, and time. She appears well-developed and well-nourished.   HENT:   Head: Normocephalic and atraumatic.   Mouth/Throat: No oropharyngeal exudate.   Eyes: EOM are normal. Pupils are equal, round, and reactive to light. Right eye exhibits no discharge. Left eye exhibits no discharge. No scleral icterus.   Neck: Normal range of motion. Neck supple. No tracheal deviation present. No thyromegaly present.   Cardiovascular: Normal rate, regular rhythm and normal heart sounds. Exam reveals no gallop and no friction rub.   No murmur heard.  Pulmonary/Chest: Effort normal and breath sounds normal. No respiratory distress. She has no wheezes. She has no rales. She exhibits no tenderness.   Abdominal: Soft. Bowel sounds are normal. She exhibits no distension and no mass. There is no tenderness. There is no rebound and no guarding.   Musculoskeletal: Normal range of motion. She exhibits no edema or tenderness.   Neurological: She is alert and oriented to person, place, and time.   Skin: Skin is warm and dry. No rash noted. No erythema. No pallor.   Psychiatric: She has a normal mood and affect. Her behavior is normal.   Vitals reviewed.      Assessment:       1. Loose stools    2. Constipation, unspecified constipation type        Plan:       1/2.  Colonoscopy  ordered.

## 2018-10-29 ENCOUNTER — TELEPHONE (OUTPATIENT)
Dept: GASTROENTEROLOGY | Facility: CLINIC | Age: 73
End: 2018-10-29

## 2018-10-29 NOTE — TELEPHONE ENCOUNTER
Spoke with patient about scheduling a Colonoscopy. Staff informed patient she has to come in for an office visit first before scheduling her procedure. Patient made an appointment with Arielle Curran on 11/20/2018.

## 2018-11-13 ENCOUNTER — PATIENT MESSAGE (OUTPATIENT)
Dept: INTERNAL MEDICINE | Facility: CLINIC | Age: 73
End: 2018-11-13

## 2018-11-14 ENCOUNTER — TELEPHONE (OUTPATIENT)
Dept: INTERNAL MEDICINE | Facility: CLINIC | Age: 73
End: 2018-11-14

## 2018-11-14 ENCOUNTER — PATIENT MESSAGE (OUTPATIENT)
Dept: INTERNAL MEDICINE | Facility: CLINIC | Age: 73
End: 2018-11-14

## 2018-11-14 RX ORDER — SERTRALINE HYDROCHLORIDE 100 MG/1
100 TABLET, FILM COATED ORAL DAILY
Qty: 90 TABLET | Refills: 3 | Status: SHIPPED | OUTPATIENT
Start: 2018-11-14 | End: 2019-11-04 | Stop reason: SDUPTHER

## 2018-11-14 NOTE — TELEPHONE ENCOUNTER
"----- Message from Annabel Sanchez sent at 11/14/2018 12:08 PM CST -----  Contact: REYNALDO/ [facility]/ [phone]  RX request - refill or new RX.NEW   Is this a refill or new RX:    RX name and strength: sertraline (ZOLOFT) 100 MG tablet  Directions:   Is this a 30 day or 90 day RX:    Local pharmacy or mail order pharmacy:    Pharmacy name and phone # (DON'T enter "on file" or "in chart"): [facility]/pharmacy #[id number] - [place] - [address] [phone] (Phone)  [phone] (Fax)      Comments:        "

## 2018-11-29 ENCOUNTER — TELEPHONE (OUTPATIENT)
Dept: GASTROENTEROLOGY | Facility: CLINIC | Age: 73
End: 2018-11-29

## 2019-01-12 RX ORDER — SIMVASTATIN 40 MG/1
TABLET, FILM COATED ORAL
Qty: 90 TABLET | Refills: 2 | Status: SHIPPED | OUTPATIENT
Start: 2019-01-12 | End: 2019-10-09 | Stop reason: SDUPTHER

## 2019-01-27 RX ORDER — CYCLOBENZAPRINE HCL 10 MG
TABLET ORAL
Qty: 30 TABLET | Refills: 6 | Status: SHIPPED | OUTPATIENT
Start: 2019-01-27 | End: 2019-08-31 | Stop reason: SDUPTHER

## 2019-02-20 ENCOUNTER — TELEPHONE (OUTPATIENT)
Dept: ADMINISTRATIVE | Facility: HOSPITAL | Age: 74
End: 2019-02-20

## 2019-02-24 RX ORDER — METOPROLOL SUCCINATE 25 MG/1
25 TABLET, EXTENDED RELEASE ORAL DAILY
Qty: 30 TABLET | Refills: 11 | Status: SHIPPED | OUTPATIENT
Start: 2019-02-24 | End: 2020-02-16

## 2019-03-20 ENCOUNTER — TELEPHONE (OUTPATIENT)
Dept: INTERNAL MEDICINE | Facility: CLINIC | Age: 74
End: 2019-03-20

## 2019-03-20 DIAGNOSIS — G43.809 OTHER MIGRAINE WITHOUT STATUS MIGRAINOSUS, NOT INTRACTABLE: Primary | ICD-10-CM

## 2019-03-20 NOTE — TELEPHONE ENCOUNTER
Pt has tried imitrex prior and medication doesn't work. Informed of neuro referral per Dr. Boles. Pt would like to continue med and pay for it out of pocket.

## 2019-07-28 RX ORDER — RIZATRIPTAN BENZOATE 10 MG/1
TABLET, ORALLY DISINTEGRATING ORAL
Qty: 15 TABLET | Refills: 11 | Status: SHIPPED | OUTPATIENT
Start: 2019-07-28 | End: 2020-08-04

## 2019-08-05 RX ORDER — BUSPIRONE HYDROCHLORIDE 15 MG/1
TABLET ORAL
Qty: 60 TABLET | Refills: 6 | Status: SHIPPED | OUTPATIENT
Start: 2019-08-05 | End: 2020-03-07

## 2019-08-06 ENCOUNTER — PATIENT OUTREACH (OUTPATIENT)
Dept: ADMINISTRATIVE | Facility: HOSPITAL | Age: 74
End: 2019-08-06

## 2019-08-23 ENCOUNTER — TELEPHONE (OUTPATIENT)
Dept: OBSTETRICS AND GYNECOLOGY | Facility: CLINIC | Age: 74
End: 2019-08-23

## 2019-08-23 NOTE — TELEPHONE ENCOUNTER
----- Message from Rosalba Modi sent at 8/23/2019  1:42 PM CDT -----  Contact: self / [phone]  Patient is requesting a call back regarding , needs her mammogram orders sent to [facility]. Please advise

## 2019-08-31 RX ORDER — CYCLOBENZAPRINE HCL 10 MG
10 TABLET ORAL 3 TIMES DAILY PRN
Qty: 30 TABLET | Refills: 6 | Status: SHIPPED | OUTPATIENT
Start: 2019-08-31 | End: 2019-10-21 | Stop reason: SDUPTHER

## 2019-09-02 RX ORDER — OXYBUTYNIN CHLORIDE 10 MG/1
TABLET, EXTENDED RELEASE ORAL
Qty: 30 TABLET | Refills: 11 | Status: SHIPPED | OUTPATIENT
Start: 2019-09-02 | End: 2020-08-22

## 2019-09-20 ENCOUNTER — PATIENT OUTREACH (OUTPATIENT)
Dept: ADMINISTRATIVE | Facility: OTHER | Age: 74
End: 2019-09-20

## 2019-09-20 ENCOUNTER — PATIENT MESSAGE (OUTPATIENT)
Dept: INTERNAL MEDICINE | Facility: CLINIC | Age: 74
End: 2019-09-20

## 2019-09-23 ENCOUNTER — OFFICE VISIT (OUTPATIENT)
Dept: OBSTETRICS AND GYNECOLOGY | Facility: CLINIC | Age: 74
End: 2019-09-23
Payer: MEDICARE

## 2019-09-23 VITALS
HEIGHT: 63 IN | SYSTOLIC BLOOD PRESSURE: 128 MMHG | DIASTOLIC BLOOD PRESSURE: 72 MMHG | BODY MASS INDEX: 26.63 KG/M2 | WEIGHT: 150.31 LBS

## 2019-09-23 DIAGNOSIS — Z12.4 ROUTINE PAPANICOLAOU SMEAR: Primary | ICD-10-CM

## 2019-09-23 DIAGNOSIS — Z01.419 WELL WOMAN EXAM WITH ROUTINE GYNECOLOGICAL EXAM: ICD-10-CM

## 2019-09-23 PROCEDURE — 99999 PR PBB SHADOW E&M-EST. PATIENT-LVL III: ICD-10-PCS | Mod: PBBFAC,,, | Performed by: OBSTETRICS & GYNECOLOGY

## 2019-09-23 PROCEDURE — 99213 OFFICE O/P EST LOW 20 MIN: CPT | Mod: PBBFAC,PO,25 | Performed by: OBSTETRICS & GYNECOLOGY

## 2019-09-23 PROCEDURE — 88175 CYTOPATH C/V AUTO FLUID REDO: CPT

## 2019-09-23 PROCEDURE — G0101 CA SCREEN;PELVIC/BREAST EXAM: HCPCS | Mod: S$PBB,,, | Performed by: OBSTETRICS & GYNECOLOGY

## 2019-09-23 PROCEDURE — G0101 PR CA SCREEN;PELVIC/BREAST EXAM: ICD-10-PCS | Mod: S$PBB,,, | Performed by: OBSTETRICS & GYNECOLOGY

## 2019-09-23 PROCEDURE — 99999 PR PBB SHADOW E&M-EST. PATIENT-LVL III: CPT | Mod: PBBFAC,,, | Performed by: OBSTETRICS & GYNECOLOGY

## 2019-09-23 PROCEDURE — G0101 CA SCREEN;PELVIC/BREAST EXAM: HCPCS | Mod: PBBFAC,PO | Performed by: OBSTETRICS & GYNECOLOGY

## 2019-09-23 NOTE — PROGRESS NOTES
"HPI:   74 y.o.   OB History        2    Para   2    Term   2            AB        Living           SAB        TAB        Ectopic        Multiple        Live Births                  No LMP recorded. Patient is postmenopausal.    Patient is  here for her annual gynecologic exam.  She has no complaints.     ROS:  GENERAL: No fever, chills, fatigability or weight loss.  SKIN: No rashes, itching or changes in color or texture of skin.  HEAD: No headaches or recent head trauma.  EYES: Visual acuity fine. No photophobia, ocular pain or diplopia.  EARS: Denies ear pain, discharge or vertigo.  NOSE: No loss of smell, no epistaxis or postnasal drip.  MOUTH & THROAT: No hoarseness or change in voice. No excessive gum bleeding.  NODES: Denies swollen glands.  CHEST: Denies FELIX, cyanosis, wheezing, cough and sputum production.  CARDIOVASCULAR: Denies chest pain, PND, orthopnea or reduced exercise tolerance.  ABDOMEN: Appetite fine. No weight loss. Denies diarrhea, abdominal pain, hematemesis or blood in stool.  URINARY: No flank pain, dysuria or hematuria.  PERIPHERAL VASCULAR: No claudication or cyanosis.  MUSCULOSKELETAL: No joint stiffness or swelling. Denies back pain.  NEUROLOGIC: No history of seizures, paralysis, alteration of gait or coordination.    PE:   /72   Ht 5' 3" (1.6 m)   Wt 68.2 kg (150 lb 4.8 oz)   BMI 26.62 kg/m²   APPEARANCE: Well nourished, well developed, in no acute distress.  NECK: Neck symmetric without masses or thyromegaly.  BREASTS: Symmetrical, no skin changes or visible lesions. No palpable masses, nipple discharge or adenopathy bilaterally.  ABDOMEN: Flat. Soft. No tenderness or masses. No hepatosplenomegaly. No hernias. No CVA tenderness.  VULVA: No lesions. Normal female genitalia.  URETHRAL MEATUS: Normal size and location, no lesions, no prolapse.  URETHRA: No masses, tenderness, prolapse or scarring.  VAGINA: Moist and well rugated, no discharge, no significant " cystocele or rectocele.  CERVIX: No lesions and discharge. PAP done.  UTERUS: Normal size, regular shape, mobile, non-tender, bladder base nontender.  ADNEXA: No masses, tenderness or CDS nodularity.  ANUS PERINEUM: Normal.    PROCEDURES:  Pap smear    Assessment:  Normal Gynecologic Exam    Plan:  Mammogram and Colonoscopy if indicated by current recommendations.  Return to clinic in one year or for any problems or complaints.  No gn co

## 2019-09-24 ENCOUNTER — TELEPHONE (OUTPATIENT)
Dept: INTERNAL MEDICINE | Facility: CLINIC | Age: 74
End: 2019-09-24

## 2019-09-24 NOTE — TELEPHONE ENCOUNTER
----- Message from Karlie Mcintyre LPN sent at 9/23/2019  5:02 PM CDT -----  Contact: Samina/[facility] [phone]      ----- Message -----  From: Jihan Welch  Sent: 9/23/2019   4:54 PM CDT  To: [facility] Staff    RE: cyclobenzaprine (FLEXERIL) 10 MG tablet    Has one question regarding the PA.    Please call and advise.    Thank You

## 2019-09-27 ENCOUNTER — PATIENT MESSAGE (OUTPATIENT)
Dept: INTERNAL MEDICINE | Facility: CLINIC | Age: 74
End: 2019-09-27

## 2019-09-30 ENCOUNTER — TELEPHONE (OUTPATIENT)
Dept: SURGERY | Facility: CLINIC | Age: 74
End: 2019-09-30

## 2019-09-30 NOTE — TELEPHONE ENCOUNTER
Returned the patient call regarding the message below.  The patient is scheduled to be seen on Thursday 10/24/19 at 11 am with Troy Ariza NP for genetic counseling/testing.  The patient voiced understanding of appointment date, time, and location.  Reminder letter mailed to the patient.

## 2019-09-30 NOTE — TELEPHONE ENCOUNTER
----- Message from Emelyn Nogueira sent at 9/30/2019 10:47 AM CDT -----  Contact: Pt.Self   Patient Requesting Sooner Appointment.     Reason for sooner appt.:  Genetic Testing Counseling     When is the first available appointment?  schedule unavailable @ time of call     Communication Preference:  [phone]     (Pt. Prefers to be called anytime during the morning)     (If no answer please leave detailed voicemail of confirmed date and time of whichever kind of appt. Scheduled for this appt. Reason)     Additional Information:  Pt. Prefers to be scheduled for her appt. Any day in the mornings no later then 1pm pt. If possible Cannot be scheduled on (10/11/19 & 11/04/19) due to other promised engagements on those dates     Thank You

## 2019-10-09 RX ORDER — SIMVASTATIN 40 MG/1
TABLET, FILM COATED ORAL
Qty: 90 TABLET | Refills: 2 | Status: SHIPPED | OUTPATIENT
Start: 2019-10-09 | End: 2020-07-07

## 2019-10-21 RX ORDER — CYCLOBENZAPRINE HCL 10 MG
TABLET ORAL
Qty: 30 TABLET | Refills: 0 | Status: SHIPPED | OUTPATIENT
Start: 2019-10-21 | End: 2019-11-03 | Stop reason: SDUPTHER

## 2019-10-23 ENCOUNTER — PATIENT OUTREACH (OUTPATIENT)
Dept: ADMINISTRATIVE | Facility: OTHER | Age: 74
End: 2019-10-23

## 2019-10-23 NOTE — PROGRESS NOTES
Patient ID: Alyce Lewis is a 74 y.o. female.    Chief Complaint: Genetic Evaluation            HPI:  New patient presents today for genetic counseling.       Personal Pertinent History:    History of breast cancer     Family Oncologic History:     Patient denies Ashkenazi Jehovah's witness ancestry.  Patient endorses  race/ethnicity.  See pedigree (will be scanned into Epic) for full family cancer history.          Review of Systems - See HPI.  Objective:   Physical Exam   Constitutional: She appears well-developed and well-nourished. No distress.   Pulmonary/Chest: Effort normal.   Neurological: She is alert.   Psychiatric: She has a normal mood and affect. Her speech is normal and behavior is normal. Thought content normal.   Assessment:       1. Encounter for nonprocreative genetic counseling    2. Personal history of breast cancer    3. Family history of pancreatic cancer          Counseling:     The following was discussed with the patient in clinic today:    Based on the information provided to me by the patient today, the patient meets criteria for genetic testing for BRCA-Related Breast and/or Ovarian Cancer Syndrome based on the NCCN Guidelines Version 3.2019.     Discussed the role of tumor suppressor genes, hereditary cancer versus familial clustering of cancers versus sporadic cancers, and multigene testing.     Discussed the possible results of genetic testing.  A positive result would indicate the presence of a pathogenic mutation, increasing the patients risk for certain cancers.  For instance, management of a pathogenic mutation in the BRCA1 or BRCA2 gene may include increased surveillance, risk-reducing surgery, chemoprevention, and lifestyle modifications; other recommendations and/or referrals to other specialists may be made depending upon the specific genetic mutation identified and the risks associated with such mutation.  A negative result with multisite testing, wherein no genetic mutation  had been previously identified in the patients family, would indicate that the patients cancer risk is not fully defined; management would then be based on personal and family history and personal risk factors.  A result of a variant of uncertain significance (VUS), meaning that it is currently unknown whether the change identified in the patients DNA causes cancer, would also indicate that the patients cancer risk is not fully defined; management would then be based on personal and family history and personal risk factors.      Discussed that specific cancer risks vary depending upon the tumor suppressor gene in which there is a mutation.      Discussed with patient that if she is positive for a mutation, her first-degree relatives each have a 50% chance of having the same mutation.      Discussed that the Genetic Information Nondiscrimination Act (FRANCES) prohibits most health insurance agencies from discrimination of an individual based on genetic test results.  Discussed that FRANCES does not protect individuals with regard to other types of policies (including but not limited to , [facility], life insurance, disability, and long-term care insurance).     Discussed the cost of testing with potential for out-of-pocket costs.  Informed patient that an outside laboratory would perform the testing, with various labs being available for this purpose, after a blood sample is collected at [facility]s lab.       Patient was offered testing today versus deferring testing at this time versus declining testing.  She desires to proceed with testing today and has provided informed consent to do so.    Informed patient that results can take several weeks.  Post-test genetic counseling will be conducted once genetic testing results are available.      Discussed that her relatives affected with cancer and their close blood-relatives should speak with a professional regarding genetic  counseling/testing.    Questions were encouraged and answered to patient's satisfaction, and patient verbalized understanding of information and agreement with the plan.     Plan:      Proceed with genetic testing sample collection for Integrated [facility]ACAnalysis with [facility] to coordinate.  Post-test genetic counseling is recommended for this patient once genetic testing results are available.     Time in counselin minutes  Total time:  52 minutes  >50% of visit was spent in face-to-face counseling.

## 2019-10-24 ENCOUNTER — OFFICE VISIT (OUTPATIENT)
Dept: SURGERY | Facility: CLINIC | Age: 74
End: 2019-10-24
Payer: MEDICARE

## 2019-10-24 DIAGNOSIS — Z80.0 FAMILY HISTORY OF PANCREATIC CANCER: ICD-10-CM

## 2019-10-24 DIAGNOSIS — Z71.83 ENCOUNTER FOR NONPROCREATIVE GENETIC COUNSELING: Primary | ICD-10-CM

## 2019-10-24 DIAGNOSIS — Z85.3 PERSONAL HISTORY OF BREAST CANCER: ICD-10-CM

## 2019-10-24 PROCEDURE — 99204 PR OFFICE/OUTPT VISIT, NEW, LEVL IV, 45-59 MIN: ICD-10-PCS | Mod: S$PBB,,, | Performed by: NURSE PRACTITIONER

## 2019-10-24 PROCEDURE — 99211 OFF/OP EST MAY X REQ PHY/QHP: CPT | Mod: PBBFAC | Performed by: NURSE PRACTITIONER

## 2019-10-24 PROCEDURE — 99204 OFFICE O/P NEW MOD 45 MIN: CPT | Mod: S$PBB,,, | Performed by: NURSE PRACTITIONER

## 2019-10-24 PROCEDURE — 99999 PR PBB SHADOW E&M-EST. PATIENT-LVL I: CPT | Mod: PBBFAC,,, | Performed by: NURSE PRACTITIONER

## 2019-10-24 PROCEDURE — 99999 PR PBB SHADOW E&M-EST. PATIENT-LVL I: ICD-10-PCS | Mod: PBBFAC,,, | Performed by: NURSE PRACTITIONER

## 2019-10-31 ENCOUNTER — TELEPHONE (OUTPATIENT)
Dept: SURGERY | Facility: CLINIC | Age: 74
End: 2019-10-31

## 2019-10-31 ENCOUNTER — DOCUMENTATION ONLY (OUTPATIENT)
Dept: SURGERY | Facility: CLINIC | Age: 74
End: 2019-10-31

## 2019-10-31 NOTE — PROGRESS NOTES
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  My response on this form regarding such is to be faxed to [facility] by breast center staff.

## 2019-10-31 NOTE — TELEPHONE ENCOUNTER
As requested by Troy Ariza NP [facility] form action needed change in test order's has been scanned & faxed over to Raphael Strong  with [employer] [phone].

## 2019-11-03 RX ORDER — CYCLOBENZAPRINE HCL 10 MG
TABLET ORAL
Qty: 30 TABLET | Refills: 0 | Status: SHIPPED | OUTPATIENT
Start: 2019-11-03 | End: 2019-12-30

## 2019-11-04 ENCOUNTER — PATIENT MESSAGE (OUTPATIENT)
Dept: INTERNAL MEDICINE | Facility: CLINIC | Age: 74
End: 2019-11-04

## 2019-11-04 RX ORDER — SERTRALINE HYDROCHLORIDE 100 MG/1
TABLET, FILM COATED ORAL
Qty: 90 TABLET | Refills: 0 | Status: SHIPPED | OUTPATIENT
Start: 2019-11-04 | End: 2020-02-03

## 2019-11-04 NOTE — TELEPHONE ENCOUNTER
One refill done.  Patient is due for an appointment to get further refills.  Has not been seen in a year.

## 2019-11-08 ENCOUNTER — DOCUMENTATION ONLY (OUTPATIENT)
Dept: HEMATOLOGY/ONCOLOGY | Facility: CLINIC | Age: 74
End: 2019-11-08

## 2019-11-12 ENCOUNTER — DOCUMENTATION ONLY (OUTPATIENT)
Dept: HEMATOLOGY/ONCOLOGY | Facility: CLINIC | Age: 74
End: 2019-11-12

## 2019-11-12 NOTE — PROGRESS NOTES
See below for notice from [facility]:        Alyce Lewis 1945 [id number]        The test(s) for this patient(s) have been started.     You will receive a notification in approximately two to three weeks when results are available on [facility].

## 2019-11-22 ENCOUNTER — TELEPHONE (OUTPATIENT)
Dept: HEMATOLOGY/ONCOLOGY | Facility: CLINIC | Age: 74
End: 2019-11-22

## 2019-11-22 ENCOUNTER — PATIENT MESSAGE (OUTPATIENT)
Dept: HEMATOLOGY/ONCOLOGY | Facility: CLINIC | Age: 74
End: 2019-11-22

## 2019-11-22 NOTE — TELEPHONE ENCOUNTER
Phoned patient.  Advised her of her HOXB13 mutation revealed on her recent genetic testing.  Discussed that this increases males' risk for prostate cancer.  Recommended in-person post-test counseling session, to which patient agreed.  Advised patient that I could see her today if she desires.  Patient declined, electing instead an appointment next week.  Appointment scheduled, and appointment date, time, and location reviewed with patient.  Questions were encouraged and answered to patient's satisfaction, and patient verbalized understanding of information and agreement with the plan.

## 2019-11-22 NOTE — PROGRESS NOTES
Patient ID: Alyce Lewis is a 74 y.o. female.    Chief Complaint: Genetic Evaluation (post-test, HOXB13 mutation-positive)           HPI:  New patient to the Department of Hematology and Oncology presents today for a genetic evaluation as it pertains to hereditary cancer risk.        Personal Pertinent History:     History of breast cancer (R breast DCIS) at 60 and melanoma at 64.     Family Oncologic History:     Patient denies Ashkenazi Orthodoxy ancestry.  Patient endorses  race/ethnicity and states her ancestry is Serbian (paternal) and [place] (maternal).  See pedigree (will be scanned into Epic) for full family cancer history.        See pedigree (which will be scanned into Epic) for full personal pertinent history and full family oncologic history.      Review of Systems - See HPI.  Objective:   Physical Exam   Constitutional: She appears well-developed and well-nourished. No distress.   Pulmonary/Chest: Effort normal.   Neurological: She is alert.   Psychiatric: She has a normal mood and affect. Her speech is normal and behavior is normal. Thought content normal.   Assessment:       1. 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      Cancer Risk Associated with HOXB13 Clinically Significant Mutation    According to [employer], through which patient underwent this genetic testing, multiple studies have shown that this variant is associated with increased risk of prostate cancer in men of  ancestry and should be regarded as clinically actionable for these  "individuals; however, it is not clear from these studies if the c.251G>A variant is actually causative of the increased risk of prostate cancer or if c.251G>A is linked with a second, unidentified genetic change which increases prostate cancer risk.  There is currently insufficient data to determine if patient's variant is associated with increased prostate cancer risk in men of other ancestries, as all studies to date have focused on men of  ancestry.  Patient's HOXB13 variant has been shown to greatly increase risk for prostate cancer; specifically, the risk of prostate cancer to age 80 is 22%-52%, compared to the risk to age 80 of only 10.9% in the general population.  Some studies have shown that men with this variant are more likely to be diagnoses with prostate cancer at younger ages than men in the general population.     According to the National Comprehensive Cancer Network (NCCN) Guidelines Version 2.2019, carriers...of the HOXB13 gene...have a significantly higher risk for prostate cancer and are more likely to have early-onset familial disease."    Recommendations    Guidelines for initial management of the associated increased risk for prostate cancer, per the [place] Prostate Cancer Consensus Conference 2017 (the full guidelines can be accessed at https://ascopubs.org/doi/full/10.1200/JCO.2017.74.1173?rfr_dat=cr_pub%3Dpubmed%26lt%3B%2Fspan%26gt%3B&url_ver=Z39.&rfr_id=rocio%3Arid%3Acrossref.org), are listed below, though the urology provider or other healthcare provider managing a patient's increased risk should take into consideration other appropriate guidelines and clinical judgment:    "HOXB13 mutation status should be factored into PCA [prostate cancer] screening discussions (Consensus: 53%).  ? Screening strategy:  ? Baseline PSA at age 40 years or 10 years prior to youngest PCA diagnosed in family (Consensus: 52%)  ? Interval of screening yearly or determined by baseline PSA " "(Consensus: 75%)    Additional considerations.  Postconsensus opinion was to consider a lower age limit to begin PSA screening, perhaps no younger than 35 years. There was strong agreement to perform PSA testing yearly or as dictated by the baseline PSA. This consensus aligns with NCCN Breast and Ovarian [email or web address] but also expands on the guideline to factor in age at diagnosis of an affected male with PCA in the family for screening initiation as is modeled in colorectal cancer guidelines."  ---    Targeted therapies may be an option in patients with a HOXB13 mutation and should be discussed between the healthcare provider and the patient with a HOXB13 mutation with or without a history of prostate cancer.    Each of patient's first-degree relatives, both males and females, has a 50% chance of having the same mutation as patient (as HOXB13 mutations are inherited in an autosomal-dominant pattern), and these relatives as well as other more distant relatives should meet with a genetics professional for genetic counseling/testing.  In some cases, relatives may need panel testing in lieu of single-site analysis.  It is important to remember that female relatives, while personally not at known increased risk for HOXB13 mutation-associated cancer, can still be carriers of this gene and then potentially pass it on to their children, and male children with the mutation would be at increased risk for prostate cancer.      Additionally, patient's close blood-relatives, her relatives with cancer or a gene mutation, and blood-relatives of her relatives with cancer or a gene mutation should each speak with a genetics professional regarding undergoing genetic counseling/testing.  I am available for testing of any relatives (patient has my contact information), or they can visit [email or web address] to locate a genetic counselor local to them.  Multigene testing may be indicated for relatives as opposed to single-site testing, and patient " should share her genetic testing results with her relatives and advise them to bring patient's results with them to their appointments.    No consultations beyond this post-test counseling session are needed for this patient, as she is female; however, her male relatives need to be seen for genetic counseling and testing and then if positive need to see a urologist for evaluation and management of HOXB13 mutation-associated increased risk for prostate cancer, as they may benefit from surveillance and/or early intervention.    Patient should ensure that all of her healthcare providers, current and future, are aware of her HOXB13 mutation-positive status.     Patient is to continue following up with all providers as they have indicated to patient and should follow up with their primary care provider to ensure all appropriate cancer screenings are in place based on age, family and personal histories, and other risk factors.      Patient should update me with with any changes to patient's personal or family history of cancer and with any personal or relative's genetic testing results and check in with me annually to determine if updated genetic testing is indicated for patient.      Additional Finding:  Variant of Uncertain Significance    Additionally, a variant of uncertain significance (VUS) was identified in the following gene:  NBN (c.266G>C [[id number]]).  A VUS indicates that amino acids within the gene are lining up in a way different from usual, but there is not presently enough data for laboratories to make a determination as to whether the specific variant is benign or pathogenic.  [facility] reviews its VUSs often, and once enough data has been collected, the VUS will likely be classified as benign or pathogenic.  At that time, individuals carrying the variant will be notified; therefore, it is important for patient to keep her demographics up to date with my office and with [facility] at all times so she  can be contacted.    Additional Information:    Provided patient today with a copy of her [facility] results folder, as well as a copy of her pedigree per her request.    Offered patient a referral to Hematology/Oncology Psychology, but she declined.    Questions were encouraged and answered to patient's satisfaction, and patient verbalized understanding of information and agreement with the plan.    Plan:       74-year-old female patient with a HOXB13 clinically significant mutation, with no known associated increased cancer risks, is to   -continue following up with all of her healthcare providers as they have indicated to her;   -ensure all healthcare providers are aware of her genetic mutation and her personal and family histories of cancers as her medical management will be based off of her personal and family histories and other risk factors;   -update me with with any changes to patient's personal or family history of cancer and with any personal or relative's genetic testing results and check in with me annually to determine if updated genetic testing is indicated for patient; and   -share her genetic testing results and available resources with her family members.     A total of approximately 55 minutes was spent on this encounter, of which >50% was spent on coordination of care and/or face-to-face counseling.

## 2019-11-26 ENCOUNTER — OFFICE VISIT (OUTPATIENT)
Dept: HEMATOLOGY/ONCOLOGY | Facility: CLINIC | Age: 74
End: 2019-11-26
Payer: MEDICARE

## 2019-11-26 DIAGNOSIS — Z71.83 ENCOUNTER FOR NONPROCREATIVE GENETIC COUNSELING: ICD-10-CM

## 2019-11-26 DIAGNOSIS — Z15.03 MUTATION IN HOXB13 GENE: Primary | ICD-10-CM

## 2019-11-26 PROCEDURE — 99999 PR PBB SHADOW E&M-EST. PATIENT-LVL I: CPT | Mod: PBBFAC,,, | Performed by: NURSE PRACTITIONER

## 2019-11-26 PROCEDURE — 99999 PR PBB SHADOW E&M-EST. PATIENT-LVL I: ICD-10-PCS | Mod: PBBFAC,,, | Performed by: NURSE PRACTITIONER

## 2019-11-26 PROCEDURE — 99204 PR OFFICE/OUTPT VISIT, NEW, LEVL IV, 45-59 MIN: ICD-10-PCS | Mod: S$PBB,,, | Performed by: NURSE PRACTITIONER

## 2019-11-26 PROCEDURE — 99211 OFF/OP EST MAY X REQ PHY/QHP: CPT | Mod: PBBFAC | Performed by: NURSE PRACTITIONER

## 2019-11-26 PROCEDURE — 99204 OFFICE O/P NEW MOD 45 MIN: CPT | Mod: S$PBB,,, | Performed by: NURSE PRACTITIONER

## 2019-12-23 ENCOUNTER — OFFICE VISIT (OUTPATIENT)
Dept: INTERNAL MEDICINE | Facility: CLINIC | Age: 74
End: 2019-12-23
Payer: MEDICARE

## 2019-12-23 VITALS
BODY MASS INDEX: 27.47 KG/M2 | SYSTOLIC BLOOD PRESSURE: 118 MMHG | RESPIRATION RATE: 16 BRPM | HEIGHT: 62 IN | DIASTOLIC BLOOD PRESSURE: 76 MMHG | HEART RATE: 97 BPM | WEIGHT: 149.25 LBS | TEMPERATURE: 98 F

## 2019-12-23 DIAGNOSIS — E78.5 HYPERLIPIDEMIA, UNSPECIFIED HYPERLIPIDEMIA TYPE: Chronic | ICD-10-CM

## 2019-12-23 DIAGNOSIS — I10 ESSENTIAL HYPERTENSION: Chronic | ICD-10-CM

## 2019-12-23 DIAGNOSIS — Z00.00 ANNUAL PHYSICAL EXAM: Primary | ICD-10-CM

## 2019-12-23 PROCEDURE — 99213 PR OFFICE/OUTPT VISIT, EST, LEVL III, 20-29 MIN: ICD-10-PCS | Mod: S$PBB,ICN,, | Performed by: INTERNAL MEDICINE

## 2019-12-23 PROCEDURE — 99213 OFFICE O/P EST LOW 20 MIN: CPT | Mod: S$PBB,ICN,, | Performed by: INTERNAL MEDICINE

## 2019-12-23 PROCEDURE — 99213 OFFICE O/P EST LOW 20 MIN: CPT | Mod: PBBFAC,PO | Performed by: INTERNAL MEDICINE

## 2019-12-23 PROCEDURE — 99999 PR PBB SHADOW E&M-EST. PATIENT-LVL III: CPT | Mod: PBBFAC,,, | Performed by: INTERNAL MEDICINE

## 2019-12-23 PROCEDURE — 99999 PR PBB SHADOW E&M-EST. PATIENT-LVL III: ICD-10-PCS | Mod: PBBFAC,,, | Performed by: INTERNAL MEDICINE

## 2019-12-26 ENCOUNTER — PATIENT MESSAGE (OUTPATIENT)
Dept: INTERNAL MEDICINE | Facility: CLINIC | Age: 74
End: 2019-12-26

## 2019-12-30 ENCOUNTER — TELEPHONE (OUTPATIENT)
Dept: INTERNAL MEDICINE | Facility: CLINIC | Age: 74
End: 2019-12-30

## 2019-12-30 RX ORDER — CYCLOBENZAPRINE HCL 10 MG
TABLET ORAL
Qty: 30 TABLET | Refills: 6 | Status: SHIPPED | OUTPATIENT
Start: 2019-12-30 | End: 2020-06-20

## 2019-12-30 NOTE — TELEPHONE ENCOUNTER
Pt emailed my personal email and is frustrated that no one scheduled her labs; please call her to schedule

## 2019-12-31 ENCOUNTER — LAB VISIT (OUTPATIENT)
Dept: LAB | Facility: HOSPITAL | Age: 74
End: 2019-12-31
Attending: INTERNAL MEDICINE
Payer: MEDICARE

## 2019-12-31 DIAGNOSIS — I10 ESSENTIAL HYPERTENSION: Chronic | ICD-10-CM

## 2019-12-31 DIAGNOSIS — E78.5 HYPERLIPIDEMIA, UNSPECIFIED HYPERLIPIDEMIA TYPE: Chronic | ICD-10-CM

## 2019-12-31 DIAGNOSIS — Z00.00 ANNUAL PHYSICAL EXAM: ICD-10-CM

## 2019-12-31 LAB
25(OH)D3+25(OH)D2 SERPL-MCNC: 48 NG/ML (ref 30–96)
ALBUMIN SERPL BCP-MCNC: 3.8 G/DL (ref 3.5–5.2)
ALP SERPL-CCNC: 65 U/L (ref 55–135)
ALT SERPL W/O P-5'-P-CCNC: 14 U/L (ref 10–44)
ANION GAP SERPL CALC-SCNC: 8 MMOL/L (ref 8–16)
AST SERPL-CCNC: 19 U/L (ref 10–40)
BASOPHILS # BLD AUTO: 0.08 K/UL (ref 0–0.2)
BASOPHILS NFR BLD: 1.2 % (ref 0–1.9)
BILIRUB SERPL-MCNC: 0.3 MG/DL (ref 0.1–1)
BUN SERPL-MCNC: 11 MG/DL (ref 8–23)
CALCIUM SERPL-MCNC: 10 MG/DL (ref 8.7–10.5)
CHLORIDE SERPL-SCNC: 104 MMOL/L (ref 95–110)
CHOLEST SERPL-MCNC: 189 MG/DL (ref 120–199)
CHOLEST/HDLC SERPL: 3.4 {RATIO} (ref 2–5)
CO2 SERPL-SCNC: 28 MMOL/L (ref 23–29)
CREAT SERPL-MCNC: 0.6 MG/DL (ref 0.5–1.4)
DIFFERENTIAL METHOD: ABNORMAL
EOSINOPHIL # BLD AUTO: 0.3 K/UL (ref 0–0.5)
EOSINOPHIL NFR BLD: 4 % (ref 0–8)
ERYTHROCYTE [DISTWIDTH] IN BLOOD BY AUTOMATED COUNT: 13.2 % (ref 11.5–14.5)
EST. GFR  (AFRICAN AMERICAN): >60 ML/MIN/1.73 M^2
EST. GFR  (NON AFRICAN AMERICAN): >60 ML/MIN/1.73 M^2
GLUCOSE SERPL-MCNC: 95 MG/DL (ref 70–110)
HCT VFR BLD AUTO: 42.7 % (ref 37–48.5)
HDLC SERPL-MCNC: 56 MG/DL (ref 40–75)
HDLC SERPL: 29.6 % (ref 20–50)
HGB BLD-MCNC: 13.2 G/DL (ref 12–16)
IMM GRANULOCYTES # BLD AUTO: 0.02 K/UL (ref 0–0.04)
IMM GRANULOCYTES NFR BLD AUTO: 0.3 % (ref 0–0.5)
LDLC SERPL CALC-MCNC: 113.6 MG/DL (ref 63–159)
LYMPHOCYTES # BLD AUTO: 1.9 K/UL (ref 1–4.8)
LYMPHOCYTES NFR BLD: 29 % (ref 18–48)
MCH RBC QN AUTO: 29.7 PG (ref 27–31)
MCHC RBC AUTO-ENTMCNC: 30.9 G/DL (ref 32–36)
MCV RBC AUTO: 96 FL (ref 82–98)
MONOCYTES # BLD AUTO: 0.6 K/UL (ref 0.3–1)
MONOCYTES NFR BLD: 9.6 % (ref 4–15)
NEUTROPHILS # BLD AUTO: 3.6 K/UL (ref 1.8–7.7)
NEUTROPHILS NFR BLD: 55.9 % (ref 38–73)
NONHDLC SERPL-MCNC: 133 MG/DL
NRBC BLD-RTO: 0 /100 WBC
PLATELET # BLD AUTO: 265 K/UL (ref 150–350)
PMV BLD AUTO: 10.4 FL (ref 9.2–12.9)
POTASSIUM SERPL-SCNC: 4.1 MMOL/L (ref 3.5–5.1)
PROT SERPL-MCNC: 7.1 G/DL (ref 6–8.4)
RBC # BLD AUTO: 4.45 M/UL (ref 4–5.4)
SODIUM SERPL-SCNC: 140 MMOL/L (ref 136–145)
TRIGL SERPL-MCNC: 97 MG/DL (ref 30–150)
TSH SERPL DL<=0.005 MIU/L-ACNC: 2.13 UIU/ML (ref 0.4–4)
WBC # BLD AUTO: 6.44 K/UL (ref 3.9–12.7)

## 2019-12-31 PROCEDURE — 80061 LIPID PANEL: CPT

## 2019-12-31 PROCEDURE — 85025 COMPLETE CBC W/AUTO DIFF WBC: CPT

## 2019-12-31 PROCEDURE — 86803 HEPATITIS C AB TEST: CPT

## 2019-12-31 PROCEDURE — 80053 COMPREHEN METABOLIC PANEL: CPT

## 2019-12-31 PROCEDURE — 36415 COLL VENOUS BLD VENIPUNCTURE: CPT | Mod: PO

## 2019-12-31 PROCEDURE — 84443 ASSAY THYROID STIM HORMONE: CPT

## 2019-12-31 PROCEDURE — 82306 VITAMIN D 25 HYDROXY: CPT

## 2020-01-02 LAB — HCV AB SERPL QL IA: NEGATIVE

## 2020-02-03 RX ORDER — SERTRALINE HYDROCHLORIDE 100 MG/1
TABLET, FILM COATED ORAL
Qty: 90 TABLET | Refills: 3 | Status: SHIPPED | OUTPATIENT
Start: 2020-02-03 | End: 2020-11-25

## 2020-02-16 RX ORDER — METOPROLOL SUCCINATE 25 MG/1
TABLET, EXTENDED RELEASE ORAL
Qty: 30 TABLET | Refills: 11 | Status: SHIPPED | OUTPATIENT
Start: 2020-02-16 | End: 2021-02-12

## 2020-03-07 RX ORDER — BUSPIRONE HYDROCHLORIDE 15 MG/1
TABLET ORAL
Qty: 60 TABLET | Refills: 6 | Status: SHIPPED | OUTPATIENT
Start: 2020-03-07 | End: 2020-09-18

## 2020-04-17 RX ORDER — FLUTICASONE PROPIONATE 50 MCG
SPRAY, SUSPENSION (ML) NASAL
Qty: 16 ML | Refills: 11 | Status: SHIPPED | OUTPATIENT
Start: 2020-04-17 | End: 2022-03-22 | Stop reason: SDUPTHER

## 2020-04-23 ENCOUNTER — PATIENT MESSAGE (OUTPATIENT)
Dept: ADMINISTRATIVE | Facility: OTHER | Age: 75
End: 2020-04-23

## 2020-04-24 ENCOUNTER — PATIENT MESSAGE (OUTPATIENT)
Dept: ADMINISTRATIVE | Facility: OTHER | Age: 75
End: 2020-04-24

## 2020-05-12 ENCOUNTER — PATIENT OUTREACH (OUTPATIENT)
Dept: OTHER | Facility: OTHER | Age: 75
End: 2020-05-12

## 2020-05-12 NOTE — LETTER
May 18, 2020     Alyce Lewis  [address] [place] [address]       Dear Alyce,    [facility] Our goal is to make care effective, proactive and convenient by using data you send us from home to better treat your chronic conditions.              My name is Elva Roberson, and I am your dedicated Digital Medicine clinician. As an expert in medication management, I will help ensure that the medications you are taking continue to provide the intended benefits and help you reach your goals. You can reach me directly at [phone] or by sending me a message directly through your [facility] account.      I am Len [place] and I will be your health . My job is to help you identify lifestyle changes to improve your disease control. We will talk about nutrition, exercise, and other ways you may be able to adjust your current habits to better your health. Additionally, we will help ensure you are completing the tests and screenings that are necessary to help manage your conditions. You can reach me directly at [phone] or by sending me a message directly through your [facility] account.    Most importantly, YOU are at the center of this team. Together, we will work to improve your overall health and encourage you to meet your goals for a healthier lifestyle.     What we expect from YOU:  · Please take frequent home blood pressure measurements. We ask that you take at least 1 blood pressure reading per week, but more information will better help us get you know you. Be sure you rest for a few minutes before taking the reading in a quiet, comfortable place.     Be available to receive phone calls or [employer] messages, when appropriate, from your care team. Please let us know if there are any specific days or times that work best for us to reach you via phone.     Complete routine tests and screenings. Dont worry, we will help keep you on track!           What you should expect from  your Digital Medicine Care Team:   We will work with you to create a personalized plan of care and provide you with encouragement and education, including regarding lifestyle changes, that could help you manage your disease states.     We will adjust your current medications, if needed, and continue to monitor your long-term progress.     We will provide you and your physician with monthly progress reports after you have been in the program for more than 30 days.     We will send you reminders through [email or web address] and text messages to help ensure you do not miss any testing deadlines to help manage your disease states.    You will be able to reach us by phone or through your [employer] account by clicking our names under Care Team on the right side of the home screen.    I look forward to working with you to achieve your blood pressure goals!    We look forward to working with you to help manage your health,    Sincerely,    Your Digital Medicine Team    Please visit our websites to learn more:   · Hypertension: [email or web address]      Remember, we are not available for emergencies. If you have an emergency, please contact your doctors office directly or call [facility] on-call ([phone] or [phone]) or [phone].

## 2020-05-18 NOTE — PROGRESS NOTES
Digital Medicine: Health  Introduction    Introduced Alyce Lewis to Digital Medicine. Discussed health  role and recommended lifestyle modifications.    The history is provided by the patient. No  was used.     HYPERTENSION  Our goal is to get BP to consistently below 130/80mmHg and make the process convenient so patient can avoid extra trips to the office. Getting your blood pressure below 130/80mmHg (definition of control) will reduce your risk for heart attack, kidney failure, stroke and death (as well as kidney failure, eye disease, & dementia)      Reviewed that the Digital Medicine care team - consisting of a clinician and a health  - will follow the most current evidence-based national guidelines for treating your condition.  The health  will focus on lifestyle modifications and motivation while the clinician will focus on medication therapy.  The care team will review all data on a regular basis and reach out as needed.      Explained that one of the key parts of the program is communication with the care team.  Asked patient to respond to outreach attempts and complete questionnaires.  Stressed importance of medication adherence.    Explained that we expect patient to obtain several blood pressures per week at random times of day.  Instructed patient not to allow anyone else to use phone and monitoring device.  Confirmed appropriate BP monitoring technique.      Explained to patient that the digital medicine team is not available for emergencies.  Patient will call [facility] on-call ([phone] or [phone]) or [phone] if needed.      Patient's BP goal is 130/80.Patient's BP average is 121/75 mmHg, which is above goal, per 2017 ACC/AHA Hypertension Guidelines.        Last 5 Patient Entered Readings                                      Current 30 Day Average: 121/75     Recent Readings 5/17/2020 5/16/2020 5/15/2020 5/14/2020 5/13/2020    SBP (mmHg) 115 108 130 128 121     DBP (mmHg) 70 70 79 77 77    Pulse 92 82 80 75 78        There are no preventive care reminders to display for this patient.    Reviewed the importance of self-monitoring, medication adherence, and that the health  can be used as a resource for lifestyle modifications to help reduce or maintain a healthy lifestyle.    Sent link to [email or web address] webpages and my contact information via [employer] for future questions. Follow up scheduled.

## 2020-05-29 ENCOUNTER — PATIENT OUTREACH (OUTPATIENT)
Dept: OTHER | Facility: OTHER | Age: 75
End: 2020-05-29

## 2020-06-05 NOTE — PROGRESS NOTES
Digital Medicine: Health  Follow-Up    The history is provided by the patient. No  was used.   Follow Up  Follow-up reason(s): reading review      Readings are trending down   Feeling well. Pleased with BP readings.     INTERVENTION(S)  encouragement/support    There are no preventive care reminders to display for this patient.    Last 5 Patient Entered Readings                                      Current 30 Day Average: 117/70     Recent Readings 6/4/2020 6/2/2020 5/31/2020 5/30/2020 5/28/2020    SBP (mmHg) 121 113 133 118 116    DBP (mmHg) 76 61 78 66 75    Pulse 75 81 79 85 80            Physical Activity Screening   When asked if exercising, patient responded: yes    Patient participates in the following activities: walking    Ms. Lewis recently bought a treadmill.     Medication Adherence Screening   She did not miss a dose this month.    No questions or concerns at this time.

## 2020-06-29 PROCEDURE — 99454 REM MNTR PHYSIOL PARAM 16-30: CPT | Mod: PBBFAC,PO | Performed by: INTERNAL MEDICINE

## 2020-07-10 ENCOUNTER — PATIENT OUTREACH (OUTPATIENT)
Dept: OTHER | Facility: OTHER | Age: 75
End: 2020-07-10

## 2020-07-17 DIAGNOSIS — Z71.89 COMPLEX CARE COORDINATION: ICD-10-CM

## 2020-08-07 NOTE — PROGRESS NOTES
Digital Medicine: Health  Follow-Up    The history is provided by the patient.             Reason for review: Blood pressure at goal        Topics Covered on Call: physical activity    Additional Follow-up details: Feeling well. Average below goal at 117/72. Few annual checkups coming up soon.         Diet-Not assessed          Physical Activity-no change to routine  No change to exercise routine.       Additional physical activity details: Ms. Lewis is still walking for exercise. She states she does at least 30 minutes a day.       Medication Adherence-Medication adherence was assessed.          Continue current diet/physical activity routine. Walking        Addressed any questions or concerns and patient has my contact information if needed prior to next outreach. Patient verbalizes understanding.          There are no preventive care reminders to display for this patient.    Last 5 Patient Entered Readings                                      Current 30 Day Average: 117/72     Recent Readings 8/5/2020 8/4/2020 7/29/2020 7/28/2020 7/24/2020    SBP (mmHg) 118 119 116 126 109    DBP (mmHg) 75 78 69 77 74    Pulse 81 81 81 71 83

## 2020-08-13 ENCOUNTER — TELEPHONE (OUTPATIENT)
Dept: OBSTETRICS AND GYNECOLOGY | Facility: CLINIC | Age: 75
End: 2020-08-13

## 2020-08-13 NOTE — TELEPHONE ENCOUNTER
----- Message from [facility] sent at 8/13/2020 10:19 AM CDT -----  Pt called asking for mammo orders to be faxed over to William Limon at [phone]    Pt can be reached at [phone]

## 2020-09-11 ENCOUNTER — PATIENT OUTREACH (OUTPATIENT)
Dept: OTHER | Facility: OTHER | Age: 75
End: 2020-09-11

## 2020-09-14 ENCOUNTER — PATIENT OUTREACH (OUTPATIENT)
Dept: OTHER | Facility: OTHER | Age: 75
End: 2020-09-14

## 2020-09-23 ENCOUNTER — PATIENT OUTREACH (OUTPATIENT)
Dept: ADMINISTRATIVE | Facility: OTHER | Age: 75
End: 2020-09-23

## 2020-09-23 NOTE — PROGRESS NOTES
Health Maintenance Due   Topic Date Due    TETANUS VACCINE  [birth year]10/1963    DEXA SCAN  08/10/1986    Pneumococcal Vaccine (65+ Low/Medium Risk) (2 of 2 - PPSV23) 08/30/2017    Influenza Vaccine (1) 08/01/2020    Mammogram  08/30/2020     Updates were requested from care everywhere.  Chart was reviewed for overdue Proactive [facility] Encounters ([facility]IE[facility]) topics (CRS, Breast Cancer Screening, Eye exam)  Health Maintenance has been updated.  LINKS immunization registry triggered.  Immunizations were reconciled.

## 2020-09-25 ENCOUNTER — OFFICE VISIT (OUTPATIENT)
Dept: OBSTETRICS AND GYNECOLOGY | Facility: CLINIC | Age: 75
End: 2020-09-25
Payer: MEDICARE

## 2020-09-25 VITALS
BODY MASS INDEX: 26.43 KG/M2 | HEIGHT: 63 IN | WEIGHT: 149.19 LBS | DIASTOLIC BLOOD PRESSURE: 68 MMHG | SYSTOLIC BLOOD PRESSURE: 110 MMHG

## 2020-09-25 DIAGNOSIS — Z01.419 WELL WOMAN EXAM WITH ROUTINE GYNECOLOGICAL EXAM: Primary | ICD-10-CM

## 2020-09-25 PROCEDURE — 99999 PR PBB SHADOW E&M-EST. PATIENT-LVL III: CPT | Mod: PBBFAC,,, | Performed by: OBSTETRICS & GYNECOLOGY

## 2020-09-25 PROCEDURE — 88175 CYTOPATH C/V AUTO FLUID REDO: CPT

## 2020-09-25 PROCEDURE — 99397 PER PM REEVAL EST PAT 65+ YR: CPT | Mod: S$GLB,,, | Performed by: OBSTETRICS & GYNECOLOGY

## 2020-09-25 PROCEDURE — 99397 PR PREVENTIVE VISIT,EST,65 & OVER: ICD-10-PCS | Mod: S$GLB,,, | Performed by: OBSTETRICS & GYNECOLOGY

## 2020-09-25 PROCEDURE — 99999 PR PBB SHADOW E&M-EST. PATIENT-LVL III: ICD-10-PCS | Mod: PBBFAC,,, | Performed by: OBSTETRICS & GYNECOLOGY

## 2020-09-25 RX ORDER — TOLTERODINE 4 MG/1
CAPSULE, EXTENDED RELEASE ORAL
COMMUNITY
Start: 2006-10-04 | End: 2021-07-05

## 2020-09-25 RX ORDER — METAXALONE 400 MG/1
TABLET ORAL
COMMUNITY
Start: 2006-10-04 | End: 2022-03-22

## 2020-09-25 RX ORDER — TOPIRAMATE 25 MG/1
TABLET ORAL
COMMUNITY
Start: 2006-10-04 | End: 2021-07-05

## 2020-09-25 RX ORDER — ATENOLOL 50 MG/1
TABLET ORAL
COMMUNITY
Start: 2006-10-04 | End: 2021-02-12

## 2020-09-25 RX ORDER — TAMOXIFEN CITRATE 10 MG/1
TABLET ORAL
COMMUNITY
Start: 2006-10-04 | End: 2021-07-05

## 2020-09-25 NOTE — PROGRESS NOTES
"HPI:   75 y.o.   OB History        2    Para   2    Term   2            AB        Living           SAB        TAB        Ectopic        Multiple        Live Births                  No LMP recorded. Patient is postmenopausal.    Patient is  here for her annual gynecologic exam.  She has no complaints.     ROS:  GENERAL: No fever, chills, fatigability or weight loss.  SKIN: No rashes, itching or changes in color or texture of skin.  HEAD: No headaches or recent head trauma.  EYES: Visual acuity fine. No photophobia, ocular pain or diplopia.  EARS: Denies ear pain, discharge or vertigo.  NOSE: No loss of smell, no epistaxis or postnasal drip.  MOUTH & THROAT: No hoarseness or change in voice. No excessive gum bleeding.  NODES: Denies swollen glands.  CHEST: Denies FELIX, cyanosis, wheezing, cough and sputum production.  CARDIOVASCULAR: Denies chest pain, PND, orthopnea or reduced exercise tolerance.  ABDOMEN: Appetite fine. No weight loss. Denies diarrhea, abdominal pain, hematemesis or blood in stool.  URINARY: No flank pain, dysuria or hematuria.  PERIPHERAL VASCULAR: No claudication or cyanosis.  MUSCULOSKELETAL: No joint stiffness or swelling. Denies back pain.  NEUROLOGIC: No history of seizures, paralysis, alteration of gait or coordination.    PE:   /68   Ht 5' 3" (1.6 m)   Wt 67.7 kg (149 lb 3.2 oz)   BMI 26.43 kg/m²   APPEARANCE: Well nourished, well developed, in no acute distress.  NECK: Neck symmetric without masses or thyromegaly.  BREASTS: Symmetrical, no skin changes or visible lesions. No palpable masses, nipple discharge or adenopathy bilaterally.  ABDOMEN: Flat. Soft. No tenderness or masses. No hepatosplenomegaly. No hernias. No CVA tenderness.  VULVA: No lesions. Normal female genitalia.  URETHRAL MEATUS: Normal size and location, no lesions, no prolapse.  URETHRA: No masses, tenderness, prolapse or scarring.  VAGINA: Moist and well rugated, no discharge, no significant " cystocele or rectocele.  CERVIX: No lesions and discharge. PAP done.  UTERUS: Normal size, regular shape, mobile, non-tender, bladder base nontender.  ADNEXA: No masses, tenderness or CDS nodularity.  ANUS PERINEUM: Normal.    PROCEDURES:  Pap smear    Assessment:  Normal Gynecologic Exam    Plan:  Mammogram and Colonoscopy if indicated by current recommendations.  Return to clinic in one year or for any problems or complaints.  No gyn co  Gi and gu good

## 2020-09-25 NOTE — PROGRESS NOTES
Digital Medicine: Health  Follow-Up    The history is provided by the patient.             Reason for review: Blood pressure at goal      Additional Follow-up details: Feeling well. Average below goal at 114/72. No further questions or concerns at this time.   Gyno appointment today and had mammogram done. Came back normal. History of breat cancer.           Diet-Not assessed          Physical Activity-Not assessed    Medication Adherence-Medication Adherence not addressed.      Substance, Sleep, Stress-Not assessed         Addressed patient questions and patient has my contact information if needed prior to next outreach. Patient verbalizes understanding.          There are no preventive care reminders to display for this patient.    Last 5 Patient Entered Readings                                      Current 30 Day Average: 114/72     Recent Readings 9/24/2020 9/23/2020 9/22/2020 9/21/2020 9/20/2020    SBP (mmHg) 113 109 105 122 114    DBP (mmHg) 76 74 69 77 71    Pulse 86 90 81 80 76

## 2020-10-16 LAB
FINAL PATHOLOGIC DIAGNOSIS: NORMAL
Lab: NORMAL

## 2020-10-23 ENCOUNTER — PATIENT OUTREACH (OUTPATIENT)
Dept: OTHER | Facility: OTHER | Age: 75
End: 2020-10-23

## 2020-11-01 ENCOUNTER — PATIENT MESSAGE (OUTPATIENT)
Dept: ADMINISTRATIVE | Facility: OTHER | Age: 75
End: 2020-11-01

## 2020-11-03 ENCOUNTER — PATIENT MESSAGE (OUTPATIENT)
Dept: ADMINISTRATIVE | Facility: OTHER | Age: 75
End: 2020-11-03

## 2020-11-06 ENCOUNTER — PATIENT MESSAGE (OUTPATIENT)
Dept: ADMINISTRATIVE | Facility: OTHER | Age: 75
End: 2020-11-06

## 2020-11-11 ENCOUNTER — PATIENT MESSAGE (OUTPATIENT)
Dept: INTERNAL MEDICINE | Facility: CLINIC | Age: 75
End: 2020-11-11

## 2020-11-13 NOTE — PROGRESS NOTES
Digital Medicine: Health  Follow-Up    The history is provided by the patient.             Reason for review: Blood pressure at goal      Additional Follow-up details: Feeling well. Average below goal 108/69. States there is a lot less tension in her life. Trying to get an appointment with doctor for her cancer workout but states she can't until 2021. Overall feeling well.             Diet-Not assessed          Physical Activity-Not assessed    Medication Adherence-Medication Adherence not addressed.      Substance, Sleep, Stress-Not assessed         Addressed patient questions and patient has my contact information if needed prior to next outreach.        There are no preventive care reminders to display for this patient.    Last 5 Patient Entered Readings                                      Current 30 Day Average: 108/69     Recent Readings 11/11/2020 11/10/2020 11/7/2020 11/6/2020 11/3/2020    SBP (mmHg) 103 101 112 118 108    DBP (mmHg) 66 67 69 69 72    Pulse 74 85 85 77 71

## 2020-11-20 ENCOUNTER — PATIENT MESSAGE (OUTPATIENT)
Dept: INTERNAL MEDICINE | Facility: CLINIC | Age: 75
End: 2020-11-20

## 2020-11-25 RX ORDER — SERTRALINE HYDROCHLORIDE 100 MG/1
TABLET, FILM COATED ORAL
Qty: 90 TABLET | Refills: 1 | Status: SHIPPED | OUTPATIENT
Start: 2020-11-25 | End: 2021-05-19

## 2020-12-17 RX ORDER — RIZATRIPTAN BENZOATE 10 MG/1
TABLET, ORALLY DISINTEGRATING ORAL
Qty: 15 TABLET | Refills: 0 | Status: SHIPPED | OUTPATIENT
Start: 2020-12-17 | End: 2021-01-13

## 2020-12-29 ENCOUNTER — OFFICE VISIT (OUTPATIENT)
Dept: INTERNAL MEDICINE | Facility: CLINIC | Age: 75
End: 2020-12-29
Payer: MEDICARE

## 2020-12-29 VITALS
HEIGHT: 68 IN | TEMPERATURE: 98 F | SYSTOLIC BLOOD PRESSURE: 110 MMHG | DIASTOLIC BLOOD PRESSURE: 78 MMHG | WEIGHT: 150.38 LBS | BODY MASS INDEX: 22.79 KG/M2 | HEART RATE: 96 BPM

## 2020-12-29 DIAGNOSIS — I10 ESSENTIAL HYPERTENSION: Chronic | ICD-10-CM

## 2020-12-29 DIAGNOSIS — Z85.3 HISTORY OF BREAST CANCER: ICD-10-CM

## 2020-12-29 DIAGNOSIS — E78.5 HYPERLIPIDEMIA, UNSPECIFIED HYPERLIPIDEMIA TYPE: Chronic | ICD-10-CM

## 2020-12-29 DIAGNOSIS — F41.9 ANXIETY: Chronic | ICD-10-CM

## 2020-12-29 DIAGNOSIS — Z00.00 ANNUAL PHYSICAL EXAM: Primary | ICD-10-CM

## 2020-12-29 DIAGNOSIS — E55.9 VITAMIN D DEFICIENCY: ICD-10-CM

## 2020-12-29 DIAGNOSIS — G43.909 MIGRAINE SYNDROME: ICD-10-CM

## 2020-12-29 PROCEDURE — 99999 PR PBB SHADOW E&M-EST. PATIENT-LVL III: CPT | Mod: PBBFAC,,, | Performed by: NURSE PRACTITIONER

## 2020-12-29 PROCEDURE — 99999 PR PBB SHADOW E&M-EST. PATIENT-LVL III: ICD-10-PCS | Mod: PBBFAC,,, | Performed by: NURSE PRACTITIONER

## 2020-12-29 PROCEDURE — 99213 OFFICE O/P EST LOW 20 MIN: CPT | Mod: PBBFAC,PO | Performed by: NURSE PRACTITIONER

## 2020-12-29 PROCEDURE — 99214 OFFICE O/P EST MOD 30 MIN: CPT | Mod: S$PBB,,, | Performed by: NURSE PRACTITIONER

## 2020-12-29 PROCEDURE — 99214 PR OFFICE/OUTPT VISIT, EST, LEVL IV, 30-39 MIN: ICD-10-PCS | Mod: S$PBB,,, | Performed by: NURSE PRACTITIONER

## 2020-12-29 RX ORDER — INFLUENZA A VIRUS A/MICHIGAN/45/2015 X-275 (H1N1) ANTIGEN (FORMALDEHYDE INACTIVATED), INFLUENZA A VIRUS A/SINGAPORE/INFIMH-16-0019/2016 IVR-186 (H3N2) ANTIGEN (FORMALDEHYDE INACTIVATED), INFLUENZA B VIRUS B/PHUKET/3073/2013 ANTIGEN (FORMALDEHYDE INACTIVATED), AND INFLUENZA B VIRUS B/MARYLAND/15/2016 BX-69A ANTIGEN (FORMALDEHYDE INACTIVATED) 60; 60; 60; 60 UG/.7ML; UG/.7ML; UG/.7ML; UG/.7ML
INJECTION, SUSPENSION INTRAMUSCULAR
COMMUNITY
Start: 2020-11-20 | End: 2021-07-05

## 2020-12-30 ENCOUNTER — LAB VISIT (OUTPATIENT)
Dept: LAB | Facility: HOSPITAL | Age: 75
End: 2020-12-30
Attending: NURSE PRACTITIONER
Payer: MEDICARE

## 2020-12-30 DIAGNOSIS — I10 ESSENTIAL HYPERTENSION: Chronic | ICD-10-CM

## 2020-12-30 DIAGNOSIS — F41.9 ANXIETY: Chronic | ICD-10-CM

## 2020-12-30 DIAGNOSIS — G43.909 MIGRAINE SYNDROME: ICD-10-CM

## 2020-12-30 DIAGNOSIS — Z00.00 ANNUAL PHYSICAL EXAM: ICD-10-CM

## 2020-12-30 DIAGNOSIS — E78.5 HYPERLIPIDEMIA, UNSPECIFIED HYPERLIPIDEMIA TYPE: Chronic | ICD-10-CM

## 2020-12-30 DIAGNOSIS — E55.9 VITAMIN D DEFICIENCY: ICD-10-CM

## 2020-12-30 LAB
25(OH)D3+25(OH)D2 SERPL-MCNC: 50 NG/ML (ref 30–96)
ALBUMIN SERPL BCP-MCNC: 3.9 G/DL (ref 3.5–5.2)
ALP SERPL-CCNC: 71 U/L (ref 55–135)
ALT SERPL W/O P-5'-P-CCNC: 17 U/L (ref 10–44)
ANION GAP SERPL CALC-SCNC: 11 MMOL/L (ref 8–16)
AST SERPL-CCNC: 19 U/L (ref 10–40)
BASOPHILS # BLD AUTO: 0.08 K/UL (ref 0–0.2)
BASOPHILS NFR BLD: 1.2 % (ref 0–1.9)
BILIRUB SERPL-MCNC: 0.3 MG/DL (ref 0.1–1)
BUN SERPL-MCNC: 15 MG/DL (ref 8–23)
CALCIUM SERPL-MCNC: 9.9 MG/DL (ref 8.7–10.5)
CHLORIDE SERPL-SCNC: 103 MMOL/L (ref 95–110)
CHOLEST SERPL-MCNC: 192 MG/DL (ref 120–199)
CHOLEST/HDLC SERPL: 3.4 {RATIO} (ref 2–5)
CO2 SERPL-SCNC: 27 MMOL/L (ref 23–29)
CREAT SERPL-MCNC: 0.7 MG/DL (ref 0.5–1.4)
DIFFERENTIAL METHOD: ABNORMAL
EOSINOPHIL # BLD AUTO: 0.2 K/UL (ref 0–0.5)
EOSINOPHIL NFR BLD: 3.6 % (ref 0–8)
ERYTHROCYTE [DISTWIDTH] IN BLOOD BY AUTOMATED COUNT: 13.8 % (ref 11.5–14.5)
EST. GFR  (AFRICAN AMERICAN): >60 ML/MIN/1.73 M^2
EST. GFR  (NON AFRICAN AMERICAN): >60 ML/MIN/1.73 M^2
GLUCOSE SERPL-MCNC: 97 MG/DL (ref 70–110)
HCT VFR BLD AUTO: 40.6 % (ref 37–48.5)
HDLC SERPL-MCNC: 57 MG/DL (ref 40–75)
HDLC SERPL: 29.7 % (ref 20–50)
HGB BLD-MCNC: 12.6 G/DL (ref 12–16)
IMM GRANULOCYTES # BLD AUTO: 0.02 K/UL (ref 0–0.04)
IMM GRANULOCYTES NFR BLD AUTO: 0.3 % (ref 0–0.5)
LDLC SERPL CALC-MCNC: 115.8 MG/DL (ref 63–159)
LYMPHOCYTES # BLD AUTO: 1.9 K/UL (ref 1–4.8)
LYMPHOCYTES NFR BLD: 29 % (ref 18–48)
MCH RBC QN AUTO: 29.1 PG (ref 27–31)
MCHC RBC AUTO-ENTMCNC: 31 G/DL (ref 32–36)
MCV RBC AUTO: 94 FL (ref 82–98)
MONOCYTES # BLD AUTO: 0.7 K/UL (ref 0.3–1)
MONOCYTES NFR BLD: 10.8 % (ref 4–15)
NEUTROPHILS # BLD AUTO: 3.5 K/UL (ref 1.8–7.7)
NEUTROPHILS NFR BLD: 55.1 % (ref 38–73)
NONHDLC SERPL-MCNC: 135 MG/DL
NRBC BLD-RTO: 0 /100 WBC
PLATELET # BLD AUTO: 289 K/UL (ref 150–350)
PMV BLD AUTO: 9.9 FL (ref 9.2–12.9)
POTASSIUM SERPL-SCNC: 4.4 MMOL/L (ref 3.5–5.1)
PROT SERPL-MCNC: 7 G/DL (ref 6–8.4)
RBC # BLD AUTO: 4.33 M/UL (ref 4–5.4)
SODIUM SERPL-SCNC: 141 MMOL/L (ref 136–145)
TRIGL SERPL-MCNC: 96 MG/DL (ref 30–150)
TSH SERPL DL<=0.005 MIU/L-ACNC: 1.79 UIU/ML (ref 0.4–4)
WBC # BLD AUTO: 6.41 K/UL (ref 3.9–12.7)

## 2020-12-30 PROCEDURE — 80053 COMPREHEN METABOLIC PANEL: CPT

## 2020-12-30 PROCEDURE — 82306 VITAMIN D 25 HYDROXY: CPT

## 2020-12-30 PROCEDURE — 85025 COMPLETE CBC W/AUTO DIFF WBC: CPT

## 2020-12-30 PROCEDURE — 36415 COLL VENOUS BLD VENIPUNCTURE: CPT | Mod: PO

## 2020-12-30 PROCEDURE — 84443 ASSAY THYROID STIM HORMONE: CPT

## 2020-12-30 PROCEDURE — 80061 LIPID PANEL: CPT

## 2020-12-31 ENCOUNTER — PATIENT MESSAGE (OUTPATIENT)
Dept: INTERNAL MEDICINE | Facility: CLINIC | Age: 75
End: 2020-12-31

## 2020-12-31 DIAGNOSIS — R82.998 CALCIUM OXALATE CRYSTALS IN URINE: Primary | ICD-10-CM

## 2020-12-31 NOTE — TELEPHONE ENCOUNTER
All labs look good.  There is calcium oxylate crystals seen in the urine sample. This suggests kidney stones. So I will order kidney US to check.   Need to drink plenty of water and lemonade, decrease high protein and salt

## 2021-01-12 ENCOUNTER — HOSPITAL ENCOUNTER (OUTPATIENT)
Dept: RADIOLOGY | Facility: HOSPITAL | Age: 76
Discharge: HOME | End: 2021-01-12
Attending: NURSE PRACTITIONER
Payer: MEDICARE

## 2021-01-12 DIAGNOSIS — R82.998 CALCIUM OXALATE CRYSTALS IN URINE: ICD-10-CM

## 2021-01-12 PROCEDURE — 76770 US EXAM ABDO BACK WALL COMP: CPT | Mod: 26,,, | Performed by: RADIOLOGY

## 2021-01-12 PROCEDURE — 76770 US EXAM ABDO BACK WALL COMP: CPT | Mod: TC

## 2021-01-12 PROCEDURE — 76770 US RETROPERITONEAL COMPLETE: ICD-10-PCS | Mod: 26,,, | Performed by: RADIOLOGY

## 2021-01-13 RX ORDER — RIZATRIPTAN BENZOATE 10 MG/1
TABLET, ORALLY DISINTEGRATING ORAL
Qty: 15 TABLET | Refills: 0 | Status: SHIPPED | OUTPATIENT
Start: 2021-01-13 | End: 2021-03-16

## 2021-01-14 ENCOUNTER — TELEPHONE (OUTPATIENT)
Dept: INTERNAL MEDICINE | Facility: CLINIC | Age: 76
End: 2021-01-14

## 2021-01-15 ENCOUNTER — PATIENT MESSAGE (OUTPATIENT)
Dept: INTERNAL MEDICINE | Facility: CLINIC | Age: 76
End: 2021-01-15

## 2021-01-15 DIAGNOSIS — R82.998 CALCIUM OXALATE CRYSTALS IN URINE: Primary | ICD-10-CM

## 2021-01-20 ENCOUNTER — IMMUNIZATION (OUTPATIENT)
Dept: OBSTETRICS AND GYNECOLOGY | Facility: CLINIC | Age: 76
End: 2021-01-20
Payer: MEDICARE

## 2021-01-20 DIAGNOSIS — Z23 NEED FOR VACCINATION: Primary | ICD-10-CM

## 2021-01-20 PROCEDURE — 91300 COVID-19, MRNA, LNP-S, PF, 30 MCG/0.3 ML DOSE VACCINE: CPT | Mod: PBBFAC | Performed by: FAMILY MEDICINE

## 2021-02-10 ENCOUNTER — IMMUNIZATION (OUTPATIENT)
Dept: OBSTETRICS AND GYNECOLOGY | Facility: CLINIC | Age: 76
End: 2021-02-10
Payer: MEDICARE

## 2021-02-10 DIAGNOSIS — Z23 NEED FOR VACCINATION: Primary | ICD-10-CM

## 2021-02-10 PROCEDURE — 0002A COVID-19, MRNA, LNP-S, PF, 30 MCG/0.3 ML DOSE VACCINE: CPT | Mod: PBBFAC | Performed by: FAMILY MEDICINE

## 2021-02-10 PROCEDURE — 91300 COVID-19, MRNA, LNP-S, PF, 30 MCG/0.3 ML DOSE VACCINE: CPT | Mod: PBBFAC | Performed by: FAMILY MEDICINE

## 2021-02-12 RX ORDER — METOPROLOL SUCCINATE 25 MG/1
TABLET, EXTENDED RELEASE ORAL
Qty: 30 TABLET | Refills: 11 | Status: SHIPPED | OUTPATIENT
Start: 2021-02-12 | End: 2022-03-17

## 2021-05-12 ENCOUNTER — PES CALL (OUTPATIENT)
Dept: ADMINISTRATIVE | Facility: CLINIC | Age: 76
End: 2021-05-12

## 2021-05-13 ENCOUNTER — PATIENT MESSAGE (OUTPATIENT)
Dept: ADMINISTRATIVE | Facility: OTHER | Age: 76
End: 2021-05-13

## 2021-05-20 ENCOUNTER — OFFICE VISIT (OUTPATIENT)
Dept: INTERNAL MEDICINE | Facility: CLINIC | Age: 76
End: 2021-05-20
Payer: MEDICARE

## 2021-05-20 VITALS
SYSTOLIC BLOOD PRESSURE: 120 MMHG | DIASTOLIC BLOOD PRESSURE: 70 MMHG | HEART RATE: 83 BPM | HEIGHT: 63 IN | WEIGHT: 151.69 LBS | TEMPERATURE: 98 F | BODY MASS INDEX: 26.88 KG/M2 | OXYGEN SATURATION: 95 %

## 2021-05-20 DIAGNOSIS — I10 ESSENTIAL HYPERTENSION: Primary | ICD-10-CM

## 2021-05-20 DIAGNOSIS — F41.9 ANXIETY: ICD-10-CM

## 2021-05-20 DIAGNOSIS — G43.909 MIGRAINE SYNDROME: ICD-10-CM

## 2021-05-20 DIAGNOSIS — E78.5 HYPERLIPIDEMIA, UNSPECIFIED HYPERLIPIDEMIA TYPE: ICD-10-CM

## 2021-05-20 PROCEDURE — 99999 PR PBB SHADOW E&M-EST. PATIENT-LVL III: ICD-10-PCS | Mod: PBBFAC,,, | Performed by: INTERNAL MEDICINE

## 2021-05-20 PROCEDURE — 99214 PR OFFICE/OUTPT VISIT, EST, LEVL IV, 30-39 MIN: ICD-10-PCS | Mod: S$PBB,,, | Performed by: INTERNAL MEDICINE

## 2021-05-20 PROCEDURE — 99214 OFFICE O/P EST MOD 30 MIN: CPT | Mod: S$PBB,,, | Performed by: INTERNAL MEDICINE

## 2021-05-20 PROCEDURE — 99999 PR PBB SHADOW E&M-EST. PATIENT-LVL III: CPT | Mod: PBBFAC,,, | Performed by: INTERNAL MEDICINE

## 2021-05-20 PROCEDURE — 99213 OFFICE O/P EST LOW 20 MIN: CPT | Mod: PBBFAC,PO | Performed by: INTERNAL MEDICINE

## 2021-05-25 ENCOUNTER — LAB VISIT (OUTPATIENT)
Dept: LAB | Facility: HOSPITAL | Age: 76
End: 2021-05-25
Attending: INTERNAL MEDICINE
Payer: MEDICARE

## 2021-05-25 DIAGNOSIS — I10 ESSENTIAL HYPERTENSION: ICD-10-CM

## 2021-05-25 DIAGNOSIS — E78.5 HYPERLIPIDEMIA, UNSPECIFIED HYPERLIPIDEMIA TYPE: ICD-10-CM

## 2021-05-25 DIAGNOSIS — F41.9 ANXIETY: ICD-10-CM

## 2021-05-25 DIAGNOSIS — G43.909 MIGRAINE SYNDROME: ICD-10-CM

## 2021-05-25 LAB
25(OH)D3+25(OH)D2 SERPL-MCNC: 73 NG/ML (ref 30–96)
ALBUMIN SERPL BCP-MCNC: 4 G/DL (ref 3.5–5.2)
ALP SERPL-CCNC: 78 U/L (ref 55–135)
ALT SERPL W/O P-5'-P-CCNC: 17 U/L (ref 10–44)
ANION GAP SERPL CALC-SCNC: 8 MMOL/L (ref 8–16)
AST SERPL-CCNC: 18 U/L (ref 10–40)
BASOPHILS # BLD AUTO: 0.08 K/UL (ref 0–0.2)
BASOPHILS NFR BLD: 1.3 % (ref 0–1.9)
BILIRUB SERPL-MCNC: 0.3 MG/DL (ref 0.1–1)
BUN SERPL-MCNC: 10 MG/DL (ref 8–23)
CALCIUM SERPL-MCNC: 10.4 MG/DL (ref 8.7–10.5)
CHLORIDE SERPL-SCNC: 103 MMOL/L (ref 95–110)
CHOLEST SERPL-MCNC: 188 MG/DL (ref 120–199)
CHOLEST/HDLC SERPL: 3.1 {RATIO} (ref 2–5)
CO2 SERPL-SCNC: 29 MMOL/L (ref 23–29)
CREAT SERPL-MCNC: 0.7 MG/DL (ref 0.5–1.4)
DIFFERENTIAL METHOD: ABNORMAL
EOSINOPHIL # BLD AUTO: 0.2 K/UL (ref 0–0.5)
EOSINOPHIL NFR BLD: 4 % (ref 0–8)
ERYTHROCYTE [DISTWIDTH] IN BLOOD BY AUTOMATED COUNT: 14 % (ref 11.5–14.5)
EST. GFR  (AFRICAN AMERICAN): >60 ML/MIN/1.73 M^2
EST. GFR  (NON AFRICAN AMERICAN): >60 ML/MIN/1.73 M^2
GLUCOSE SERPL-MCNC: 105 MG/DL (ref 70–110)
HCT VFR BLD AUTO: 42 % (ref 37–48.5)
HDLC SERPL-MCNC: 61 MG/DL (ref 40–75)
HDLC SERPL: 32.4 % (ref 20–50)
HGB BLD-MCNC: 13 G/DL (ref 12–16)
IMM GRANULOCYTES # BLD AUTO: 0.01 K/UL (ref 0–0.04)
IMM GRANULOCYTES NFR BLD AUTO: 0.2 % (ref 0–0.5)
LDLC SERPL CALC-MCNC: 109.6 MG/DL (ref 63–159)
LYMPHOCYTES # BLD AUTO: 1.9 K/UL (ref 1–4.8)
LYMPHOCYTES NFR BLD: 30.9 % (ref 18–48)
MCH RBC QN AUTO: 28.6 PG (ref 27–31)
MCHC RBC AUTO-ENTMCNC: 31 G/DL (ref 32–36)
MCV RBC AUTO: 92 FL (ref 82–98)
MONOCYTES # BLD AUTO: 0.6 K/UL (ref 0.3–1)
MONOCYTES NFR BLD: 10.1 % (ref 4–15)
NEUTROPHILS # BLD AUTO: 3.3 K/UL (ref 1.8–7.7)
NEUTROPHILS NFR BLD: 53.5 % (ref 38–73)
NONHDLC SERPL-MCNC: 127 MG/DL
NRBC BLD-RTO: 0 /100 WBC
PLATELET # BLD AUTO: 273 K/UL (ref 150–450)
PMV BLD AUTO: 10.4 FL (ref 9.2–12.9)
POTASSIUM SERPL-SCNC: 4.8 MMOL/L (ref 3.5–5.1)
PROT SERPL-MCNC: 7.2 G/DL (ref 6–8.4)
RBC # BLD AUTO: 4.55 M/UL (ref 4–5.4)
SODIUM SERPL-SCNC: 140 MMOL/L (ref 136–145)
TRIGL SERPL-MCNC: 87 MG/DL (ref 30–150)
TSH SERPL DL<=0.005 MIU/L-ACNC: 1.59 UIU/ML (ref 0.4–4)
WBC # BLD AUTO: 6.06 K/UL (ref 3.9–12.7)

## 2021-05-25 PROCEDURE — 82306 VITAMIN D 25 HYDROXY: CPT | Performed by: INTERNAL MEDICINE

## 2021-05-25 PROCEDURE — 84443 ASSAY THYROID STIM HORMONE: CPT | Performed by: INTERNAL MEDICINE

## 2021-05-25 PROCEDURE — 85025 COMPLETE CBC W/AUTO DIFF WBC: CPT | Performed by: INTERNAL MEDICINE

## 2021-05-25 PROCEDURE — 80061 LIPID PANEL: CPT | Performed by: INTERNAL MEDICINE

## 2021-05-25 PROCEDURE — 36415 COLL VENOUS BLD VENIPUNCTURE: CPT | Mod: PO | Performed by: INTERNAL MEDICINE

## 2021-05-25 PROCEDURE — 80053 COMPREHEN METABOLIC PANEL: CPT | Performed by: INTERNAL MEDICINE

## 2021-06-21 ENCOUNTER — APPOINTMENT (OUTPATIENT)
Dept: RADIOLOGY | Facility: CLINIC | Age: 76
End: 2021-06-21
Attending: INTERNAL MEDICINE
Payer: MEDICARE

## 2021-06-21 DIAGNOSIS — E78.5 HYPERLIPIDEMIA, UNSPECIFIED HYPERLIPIDEMIA TYPE: ICD-10-CM

## 2021-06-21 DIAGNOSIS — F41.9 ANXIETY: ICD-10-CM

## 2021-06-21 DIAGNOSIS — I10 ESSENTIAL HYPERTENSION: ICD-10-CM

## 2021-06-21 DIAGNOSIS — G43.909 MIGRAINE SYNDROME: ICD-10-CM

## 2021-06-21 PROCEDURE — 77080 DXA BONE DENSITY AXIAL: CPT | Mod: 26,,, | Performed by: INTERNAL MEDICINE

## 2021-06-21 PROCEDURE — 77080 DEXA BONE DENSITY SPINE HIP: ICD-10-PCS | Mod: 26,,, | Performed by: INTERNAL MEDICINE

## 2021-06-21 PROCEDURE — 77080 DXA BONE DENSITY AXIAL: CPT | Mod: TC,PO

## 2021-06-22 ENCOUNTER — TELEPHONE (OUTPATIENT)
Dept: ADMINISTRATIVE | Facility: CLINIC | Age: 76
End: 2021-06-22

## 2021-06-23 ENCOUNTER — OFFICE VISIT (OUTPATIENT)
Dept: INTERNAL MEDICINE | Facility: CLINIC | Age: 76
End: 2021-06-23
Payer: MEDICARE

## 2021-06-23 VITALS
SYSTOLIC BLOOD PRESSURE: 128 MMHG | TEMPERATURE: 98 F | HEIGHT: 63 IN | DIASTOLIC BLOOD PRESSURE: 70 MMHG | HEART RATE: 74 BPM | RESPIRATION RATE: 16 BRPM | BODY MASS INDEX: 26.95 KG/M2 | WEIGHT: 152.13 LBS

## 2021-06-23 DIAGNOSIS — C43.61 MALIGNANT MELANOMA OF RIGHT UPPER EXTREMITY INCLUDING SHOULDER: ICD-10-CM

## 2021-06-23 DIAGNOSIS — Z98.49 HISTORY OF CATARACT EXTRACTION, UNSPECIFIED LATERALITY: ICD-10-CM

## 2021-06-23 DIAGNOSIS — Z00.00 ENCOUNTER FOR PREVENTIVE HEALTH EXAMINATION: ICD-10-CM

## 2021-06-23 DIAGNOSIS — E78.2 MIXED HYPERLIPIDEMIA: Chronic | ICD-10-CM

## 2021-06-23 DIAGNOSIS — Z17.1 MALIGNANT NEOPLASM OF LOWER-OUTER QUADRANT OF RIGHT BREAST OF FEMALE, ESTROGEN RECEPTOR NEGATIVE: ICD-10-CM

## 2021-06-23 DIAGNOSIS — G43.909 MIGRAINE SYNDROME: ICD-10-CM

## 2021-06-23 DIAGNOSIS — Z00.00 ANNUAL VISIT FOR GENERAL ADULT MEDICAL EXAMINATION WITHOUT ABNORMAL FINDINGS: Primary | ICD-10-CM

## 2021-06-23 DIAGNOSIS — I10 ESSENTIAL HYPERTENSION: Chronic | ICD-10-CM

## 2021-06-23 DIAGNOSIS — F41.9 ANXIETY: Chronic | ICD-10-CM

## 2021-06-23 DIAGNOSIS — C50.511 MALIGNANT NEOPLASM OF LOWER-OUTER QUADRANT OF RIGHT BREAST OF FEMALE, ESTROGEN RECEPTOR NEGATIVE: ICD-10-CM

## 2021-06-23 PROCEDURE — G0439 PR MEDICARE ANNUAL WELLNESS SUBSEQUENT VISIT: ICD-10-PCS | Mod: ,,, | Performed by: INTERNAL MEDICINE

## 2021-06-23 PROCEDURE — 99214 OFFICE O/P EST MOD 30 MIN: CPT | Mod: PBBFAC,PO | Performed by: INTERNAL MEDICINE

## 2021-06-23 PROCEDURE — 99999 PR PBB SHADOW E&M-EST. PATIENT-LVL IV: CPT | Mod: PBBFAC,,, | Performed by: INTERNAL MEDICINE

## 2021-06-23 PROCEDURE — G0439 PPPS, SUBSEQ VISIT: HCPCS | Mod: ,,, | Performed by: INTERNAL MEDICINE

## 2021-06-23 PROCEDURE — 90714 TD VACC NO PRESV 7 YRS+ IM: CPT | Mod: PBBFAC,PO

## 2021-06-23 PROCEDURE — 99999 PR PBB SHADOW E&M-EST. PATIENT-LVL IV: ICD-10-PCS | Mod: PBBFAC,,, | Performed by: INTERNAL MEDICINE

## 2021-12-09 RX ORDER — SIMVASTATIN 40 MG/1
TABLET, FILM COATED ORAL
Qty: 90 TABLET | Refills: 1 | Status: SHIPPED | OUTPATIENT
Start: 2021-12-09 | End: 2022-08-17

## 2021-12-09 RX ORDER — CYCLOBENZAPRINE HCL 10 MG
TABLET ORAL
Qty: 30 TABLET | Refills: 6 | Status: SHIPPED | OUTPATIENT
Start: 2021-12-09 | End: 2022-06-23

## 2022-01-05 ENCOUNTER — TELEPHONE (OUTPATIENT)
Dept: INTERNAL MEDICINE | Facility: CLINIC | Age: 77
End: 2022-01-05
Payer: MEDICARE

## 2022-03-08 NOTE — TELEPHONE ENCOUNTER
Care Due:                  Date            Visit Type   Department     Provider  --------------------------------------------------------------------------------                                ENHANCED                              ANNUAL                              WELLNESS     [facility] INTERNAL  Last Visit: 06-      VISIT        MEDICINE       Ry Lisa  Next Visit: None Scheduled  None         None Found                                                            Last  Test          Frequency    Reason                     Performed    Due Date  --------------------------------------------------------------------------------    CMP.........  12 months..  simvastatin..............  05- 05-    Lipid Panel.  12 months..  simvastatin..............  05- 05-    Powered by [employer]. Reference number: [id number].   3/08/2022 12:17:59 PM CST

## 2022-03-13 RX ORDER — SERTRALINE HYDROCHLORIDE 100 MG/1
TABLET, FILM COATED ORAL
Qty: 90 TABLET | Refills: 0 | Status: SHIPPED | OUTPATIENT
Start: 2022-03-13 | End: 2022-07-24

## 2022-03-14 NOTE — TELEPHONE ENCOUNTER
Refill Center Decision:   APPROVE - Med(s) that meet protocol have been signed     Provider Staff:     Action is required for this patient.   Please see care gap opportunities below in Care Due Message.     Thanks!  [facility]     Appointments      Date Provider   Last Visit   5/20/2021 Charity Crandall DO   Next Visit   Visit date not found Charity Crandall DO     Note composed:7:35 PM 03/13/2022

## 2022-03-17 RX ORDER — METOPROLOL SUCCINATE 25 MG/1
TABLET, EXTENDED RELEASE ORAL
Qty: 30 TABLET | Refills: 11 | Status: SHIPPED | OUTPATIENT
Start: 2022-03-17 | End: 2023-01-11 | Stop reason: SDUPTHER

## 2022-03-17 NOTE — TELEPHONE ENCOUNTER
No new care gaps identified.  Powered by [employer]. Reference number: [id number].   3/16/2022 10:46:42 PM CDT

## 2022-03-17 NOTE — TELEPHONE ENCOUNTER
----- Message from Prabha Jonas sent at 3/17/2022  9:33 AM CDT -----  Contact: [phone] Patient  Doctor appointment and lab have been scheduled.  Please link lab orders to the lab appointment.  Date of doctor appointment:  03/22  Date of lab appointment:  03/18  Physical or F/U: physical  Comments:

## 2022-03-17 NOTE — TELEPHONE ENCOUNTER
----- Message from Prabha Jonas sent at 3/17/2022  9:34 AM CDT -----  Contact: [phone] Patient  Requesting an RX refill or new RX.  Is this a refill or new RX: new  RX name and strength (copy/paste from chart):  metoprolol succinate (TOPROL-XL) 25 MG 24 hr tablet  Is this a 30 day or 90 day RX: 30  Pharmacy name and phone # (copy/paste from chart):    [facility]/pharmacy #[id number] - [place] - [address]  [address]  [place] [address]  Phone: [phone] Fax: [phone]

## 2022-03-18 ENCOUNTER — LAB VISIT (OUTPATIENT)
Dept: LAB | Facility: HOSPITAL | Age: 77
End: 2022-03-18
Attending: NURSE PRACTITIONER
Payer: MEDICARE

## 2022-03-18 DIAGNOSIS — I10 ESSENTIAL HYPERTENSION: ICD-10-CM

## 2022-03-18 DIAGNOSIS — E78.2 MIXED HYPERLIPIDEMIA: ICD-10-CM

## 2022-03-18 DIAGNOSIS — E78.5 HYPERLIPIDEMIA, UNSPECIFIED HYPERLIPIDEMIA TYPE: ICD-10-CM

## 2022-03-18 DIAGNOSIS — I10 PRIMARY HYPERTENSION: ICD-10-CM

## 2022-03-18 DIAGNOSIS — R73.01 IMPAIRED FASTING GLUCOSE: ICD-10-CM

## 2022-03-18 DIAGNOSIS — I10 ESSENTIAL HYPERTENSION: Primary | ICD-10-CM

## 2022-03-18 LAB
ALBUMIN SERPL BCP-MCNC: 3.9 G/DL (ref 3.5–5.2)
ALP SERPL-CCNC: 68 U/L (ref 55–135)
ALT SERPL W/O P-5'-P-CCNC: 16 U/L (ref 10–44)
ANION GAP SERPL CALC-SCNC: 11 MMOL/L (ref 8–16)
AST SERPL-CCNC: 16 U/L (ref 10–40)
BASOPHILS # BLD AUTO: 0.04 K/UL (ref 0–0.2)
BASOPHILS NFR BLD: 0.7 % (ref 0–1.9)
BILIRUB SERPL-MCNC: 0.3 MG/DL (ref 0.1–1)
BUN SERPL-MCNC: 13 MG/DL (ref 8–23)
CALCIUM SERPL-MCNC: 10.2 MG/DL (ref 8.7–10.5)
CHLORIDE SERPL-SCNC: 104 MMOL/L (ref 95–110)
CHOLEST SERPL-MCNC: 186 MG/DL (ref 120–199)
CHOLEST/HDLC SERPL: 3.5 {RATIO} (ref 2–5)
CO2 SERPL-SCNC: 26 MMOL/L (ref 23–29)
CREAT SERPL-MCNC: 0.6 MG/DL (ref 0.5–1.4)
DIFFERENTIAL METHOD: NORMAL
EOSINOPHIL # BLD AUTO: 0.2 K/UL (ref 0–0.5)
EOSINOPHIL NFR BLD: 3.6 % (ref 0–8)
ERYTHROCYTE [DISTWIDTH] IN BLOOD BY AUTOMATED COUNT: 13.4 % (ref 11.5–14.5)
EST. GFR  (AFRICAN AMERICAN): >60 ML/MIN/1.73 M^2
EST. GFR  (NON AFRICAN AMERICAN): >60 ML/MIN/1.73 M^2
ESTIMATED AVG GLUCOSE: 120 MG/DL (ref 68–131)
GLUCOSE SERPL-MCNC: 105 MG/DL (ref 70–110)
HBA1C MFR BLD: 5.8 % (ref 4–5.6)
HCT VFR BLD AUTO: 40.6 % (ref 37–48.5)
HDLC SERPL-MCNC: 53 MG/DL (ref 40–75)
HDLC SERPL: 28.5 % (ref 20–50)
HGB BLD-MCNC: 13 G/DL (ref 12–16)
IMM GRANULOCYTES # BLD AUTO: 0.01 K/UL (ref 0–0.04)
IMM GRANULOCYTES NFR BLD AUTO: 0.2 % (ref 0–0.5)
LDLC SERPL CALC-MCNC: 115 MG/DL (ref 63–159)
LYMPHOCYTES # BLD AUTO: 1.7 K/UL (ref 1–4.8)
LYMPHOCYTES NFR BLD: 30 % (ref 18–48)
MCH RBC QN AUTO: 29.4 PG (ref 27–31)
MCHC RBC AUTO-ENTMCNC: 32 G/DL (ref 32–36)
MCV RBC AUTO: 92 FL (ref 82–98)
MONOCYTES # BLD AUTO: 0.6 K/UL (ref 0.3–1)
MONOCYTES NFR BLD: 11.3 % (ref 4–15)
NEUTROPHILS # BLD AUTO: 3 K/UL (ref 1.8–7.7)
NEUTROPHILS NFR BLD: 54.2 % (ref 38–73)
NONHDLC SERPL-MCNC: 133 MG/DL
NRBC BLD-RTO: 0 /100 WBC
PLATELET # BLD AUTO: 255 K/UL (ref 150–450)
PMV BLD AUTO: 10.3 FL (ref 9.2–12.9)
POTASSIUM SERPL-SCNC: 4.2 MMOL/L (ref 3.5–5.1)
PROT SERPL-MCNC: 7 G/DL (ref 6–8.4)
RBC # BLD AUTO: 4.42 M/UL (ref 4–5.4)
SODIUM SERPL-SCNC: 141 MMOL/L (ref 136–145)
TRIGL SERPL-MCNC: 90 MG/DL (ref 30–150)
TSH SERPL DL<=0.005 MIU/L-ACNC: 1.9 UIU/ML (ref 0.4–4)
WBC # BLD AUTO: 5.5 K/UL (ref 3.9–12.7)

## 2022-03-18 PROCEDURE — 36415 COLL VENOUS BLD VENIPUNCTURE: CPT | Mod: PO | Performed by: INTERNAL MEDICINE

## 2022-03-18 PROCEDURE — 85025 COMPLETE CBC W/AUTO DIFF WBC: CPT | Performed by: INTERNAL MEDICINE

## 2022-03-18 PROCEDURE — 80061 LIPID PANEL: CPT | Performed by: INTERNAL MEDICINE

## 2022-03-18 PROCEDURE — 80053 COMPREHEN METABOLIC PANEL: CPT | Performed by: INTERNAL MEDICINE

## 2022-03-18 PROCEDURE — 83036 HEMOGLOBIN GLYCOSYLATED A1C: CPT | Performed by: INTERNAL MEDICINE

## 2022-03-18 PROCEDURE — 84443 ASSAY THYROID STIM HORMONE: CPT | Performed by: INTERNAL MEDICINE

## 2022-03-21 NOTE — PROGRESS NOTES
Subjective:       Patient ID: Alyce Lewis is a 76 y.o. female.    Chief Complaint: No chief complaint on file.      Patient is a 76 y.o. female who traditionally follows with Charity Crandall DO presenting today for ***      Review of patient's allergies indicates:   Allergen Reactions    Penicillins Hives    Demerol [meperidine] Nausea And Vomiting    Sulfa (sulfonamide antibiotics) Hives       Medication List with Changes/Refills   Current Medications    BUSPIRONE (BUSPAR) 15 MG TABLET    TAKE 1 TABLET BY MOUTH TWICE A DAY    CYCLOBENZAPRINE (FLEXERIL) 10 MG TABLET    TAKE 1 TABLET BY MOUTH 2 (TWO) TIMES DAILY AS NEEDED FOR MUSCLE SPASMS.    FLUTICASONE PROPIONATE (FLONASE) 50 MCG/ACTUATION NASAL SPRAY    SPRAY 2 SPRAYS INTO EACH NOSTRIL EVERY DAY    METAXALONE (SKELAXIN) 400 MG TABLET    Take by mouth.    METOPROLOL SUCCINATE (TOPROL-XL) 25 MG 24 HR TABLET    TAKE 1 TABLET BY MOUTH EVERY DAY    OXYBUTYNIN (DITROPAN-XL) 10 MG 24 HR TABLET    TAKE 1 TABLET BY MOUTH EVERY DAY    RIZATRIPTAN (MAXALT-MLT) 10 MG DISINTEGRATING TABLET    PLACE 1 TABLET ON THE TONGUE/ ALLOW TO DISSOLVE AS NEEDED FOR MIGRAINE. MAY REPEAT IN 2 HOURS    SERTRALINE (ZOLOFT) 100 MG TABLET    TAKE 1 TABLET BY MOUTH EVERY DAY    SIMVASTATIN (ZOCOR) 40 MG TABLET    TAKE 1 TABLET BY MOUTH EVERY DAY EVERY NIGHT       Health Maintenance    MMG  PAP   DEXA  Colonoscopy  Occult Blood/Cologuard  Flu Vaccine  Pneumonia 23 Vaccine  Pneumonia 13 Vaccine  Tetanus/Tdap  Zoster Vaccine  Hepatitis C Screen   HIV Screen   PSA  Eye Exam  Foot Exam  Microalbumin    {Smoking Hx:40559}    Medical, social and surgical history has been reviewed with the patient.        ROS      Objective:   There were no vitals taken for this visit.      Physical Exam    Last Labs:  Glucose   Date Value Ref Range Status   03/18/2022 105 70 - 110 mg/dL Final   05/25/2021 105 70 - 110 mg/dL Final     BUN   Date Value Ref Range Status   03/18/2022 13 8 - 23 mg/dL Final    05/25/2021 10 8 - 23 mg/dL Final     Creatinine   Date Value Ref Range Status   03/18/2022 0.6 0.5 - 1.4 mg/dL Final   05/25/2021 0.7 0.5 - 1.4 mg/dL Final     Potassium   Date Value Ref Range Status   03/18/2022 4.2 3.5 - 5.1 mmol/L Final   05/25/2021 4.8 3.5 - 5.1 mmol/L Final     Cholesterol   Date Value Ref Range Status   03/18/2022 186 120 - 199 mg/dL Final     Comment:     The National Cholesterol Education Program (NCEP) has set the  following guidelines (reference ranges) for Cholesterol:  Optimal.....................<200 mg/dL  Borderline High.............200-239 mg/dL  High........................> or = 240 mg/dL     05/25/2021 188 120 - 199 mg/dL Final     Comment:     The National Cholesterol Education Program (NCEP) has set the  following guidelines (reference ranges) for Cholesterol:  Optimal.....................<200 mg/dL  Borderline High.............200-239 mg/dL  High........................> or = 240 mg/dL       Hemoglobin A1C   Date Value Ref Range Status   03/18/2022 5.8 (H) 4.0 - 5.6 % Final     Comment:     ADA Screening Guidelines:  5.7-6.4%  Consistent with prediabetes  >or=6.5%  Consistent with diabetes    High levels of fetal hemoglobin interfere with the HbA1C  assay. Heterozygous hemoglobin variants (HbS, HgC, etc)do  not significantly interfere with this assay.   However, presence of multiple variants may affect accuracy.       Hemoglobin   Date Value Ref Range Status   03/18/2022 13.0 12.0 - 16.0 g/dL Final   05/25/2021 13.0 12.0 - 16.0 g/dL Final     Hematocrit   Date Value Ref Range Status   03/18/2022 40.6 37.0 - 48.5 % Final   05/25/2021 42.0 37.0 - 48.5 % Final     Vit D, 25-Hydroxy   Date Value Ref Range Status   05/25/2021 73 30 - 96 ng/mL Final     Comment:     Vitamin D deficiency.........<10 ng/mL                              Vitamin D insufficiency......10-29 ng/mL       Vitamin D sufficiency........> or equal to 30 ng/mL  Vitamin D toxicity............>100 ng/mL      2020 50 30 - 96 ng/mL Final     Comment:     Vitamin D deficiency.........<10 ng/mL                              Vitamin D insufficiency......10-29 ng/mL       Vitamin D sufficiency........> or equal to 30 ng/mL  Vitamin D toxicity............>100 ng/mL         I have reviewed the following:     Details / Date    [x]   Labs     []   Micro     []   Pathology     []   Imaging     []   Cardiology Procedures     []   Other        Assessment and Plan:     There are no diagnoses linked to this encounter.      {SMOKING CESSATION COUNSELIN}

## 2022-03-21 NOTE — PROGRESS NOTES
Subjective:       Patient ID: Alyce Lewis is a 76 y.o. female.    Chief Complaint: Annual Wellness Visit      Alyce Lewis is a 76 y.o. female who presents today for an annual wellness visit.   She voices no concerns or complaints.     Review of patient's allergies indicates:   Allergen Reactions    Penicillins Hives    Demerol [meperidine] Nausea And Vomiting    Sulfa (sulfonamide antibiotics) Hives      Medication List with Changes/Refills   Current Medications    BUSPIRONE (BUSPAR) 15 MG TABLET    TAKE 1 TABLET BY MOUTH TWICE A DAY    CYCLOBENZAPRINE (FLEXERIL) 10 MG TABLET    TAKE 1 TABLET BY MOUTH 2 (TWO) TIMES DAILY AS NEEDED FOR MUSCLE SPASMS.    METOPROLOL SUCCINATE (TOPROL-XL) 25 MG 24 HR TABLET    TAKE 1 TABLET BY MOUTH EVERY DAY    OXYBUTYNIN (DITROPAN-XL) 10 MG 24 HR TABLET    TAKE 1 TABLET BY MOUTH EVERY DAY    SERTRALINE (ZOLOFT) 100 MG TABLET    TAKE 1 TABLET BY MOUTH EVERY DAY    SIMVASTATIN (ZOCOR) 40 MG TABLET    TAKE 1 TABLET BY MOUTH EVERY DAY EVERY NIGHT   Changed and/or Refilled Medications    Modified Medication Previous Medication    FLUTICASONE PROPIONATE (FLONASE) 50 MCG/ACTUATION NASAL SPRAY fluticasone propionate (FLONASE) 50 mcg/actuation nasal spray       2 sprays (100 mcg total) by Each Nostril route once daily at 6am.    SPRAY 2 SPRAYS INTO EACH NOSTRIL EVERY DAY    RIZATRIPTAN (MAXALT-MLT) 10 MG DISINTEGRATING TABLET rizatriptan (MAXALT-MLT) 10 MG disintegrating tablet       May repeat in 2 hours if needed    PLACE 1 TABLET ON THE TONGUE/ ALLOW TO DISSOLVE AS NEEDED FOR MIGRAINE. MAY REPEAT IN 2 HOURS   Discontinued Medications    METAXALONE (SKELAXIN) 400 MG TABLET    Take by mouth.       Health Maintenance    MMG: UTD per patient   PAP: 09/25/2020   DEXA: 06/21/2021  Pneumonia 23 Vaccine: 1/1/2008  Pneumonia 13 Vaccine: 08/30/2016  Tetanus/Tdap: 06/2021  Zoster Vaccine: 08/21/2019, 03/19/2008  Hepatitis C Screen: 12/31/2019   Microalbumin: 03/22/2022     How would you  "described your general health: Good    Patient ambulates on her own without an assistive device.     On average, how many days per week do you do moderate to strenuous exercise:  (like a brisk walk or jog; this does not include your job/work)  5-6     On average, how many minutes do you exercise at this level each day? 30    Do you eat fruits and vegetable every day?  Yes    Do you have any questions or concerns about your eating habits?  No    Do you have any concerns in regards to access to food? No    Does you partner control where you go or make you feel afraid? No    Have you ever had a partner who physically hurt or threatened you? No    Have you ever used tobacco products? No    Do you still have a menstrual cycle? No       If not,  menopause    For post-menopausal women:  Are you taking a daily supplement with both Vitamin D and Calcium? Yes  Have you had any bleeding since you stopped having periods?  No  Is urination or leaking urine a problem for you? No    Do you go to the dentist regularly? Yes  When was you last exam:     Do you go to the eye doctor regularly? Yes  When was your last exam:    Have you often been bothered by feeling down, depressed, or hopeless?  several days    Have you often been bothered by having little interest or pleasure in doing things?  several days      Review of Systems   Constitutional: Negative for fever.   HENT: Negative for congestion and sore throat.    Respiratory: Negative for cough and shortness of breath.    Cardiovascular: Negative for chest pain.   Gastrointestinal: Negative for abdominal pain, nausea and vomiting.   Musculoskeletal: Negative for back pain and falls.   Neurological: Negative for dizziness and weakness.   Psychiatric/Behavioral: Negative for memory loss.       Objective:     /82 (BP Location: Left arm, Patient Position: Sitting, BP Method: Large (Manual))   Pulse 90   Temp 97.5 °F (36.4 °C) (Temporal)   Resp 12   Ht 5' 2" (1.575 m)   Wt " 69.3 kg (152 lb 12.5 oz)   SpO2 96%   BMI 27.94 kg/m²     Physical Exam  Vitals reviewed.   Constitutional:       Appearance: Normal appearance.   HENT:      Head: Normocephalic and atraumatic.   Cardiovascular:      Rate and Rhythm: Normal rate and regular rhythm.      Heart sounds: Normal heart sounds. No murmur heard.  Pulmonary:      Effort: Pulmonary effort is normal.      Breath sounds: Normal breath sounds. No wheezing.   Skin:     General: Skin is warm and dry.   Neurological:      Mental Status: She is alert and oriented to person, place, and time.       BP Readings from Last 3 Encounters:   03/22/22 122/82   06/23/21 128/70   05/20/21 120/70     Wt Readings from Last 3 Encounters:   03/22/22 69.3 kg (152 lb 12.5 oz)   06/23/21 69 kg (152 lb 1.9 oz)   05/20/21 68.8 kg (151 lb 10.8 oz)       Last Labs:  Glucose   Date Value Ref Range Status   03/18/2022 105 70 - 110 mg/dL Final   05/25/2021 105 70 - 110 mg/dL Final     BUN   Date Value Ref Range Status   03/18/2022 13 8 - 23 mg/dL Final   05/25/2021 10 8 - 23 mg/dL Final     Creatinine   Date Value Ref Range Status   03/18/2022 0.6 0.5 - 1.4 mg/dL Final   05/25/2021 0.7 0.5 - 1.4 mg/dL Final     Cholesterol   Date Value Ref Range Status   03/18/2022 186 120 - 199 mg/dL Final     Comment:     The National Cholesterol Education Program (NCEP) has set the  following guidelines (reference ranges) for Cholesterol:  Optimal.....................<200 mg/dL  Borderline High.............200-239 mg/dL  High........................> or = 240 mg/dL     05/25/2021 188 120 - 199 mg/dL Final     Comment:     The National Cholesterol Education Program (NCEP) has set the  following guidelines (reference ranges) for Cholesterol:  Optimal.....................<200 mg/dL  Borderline High.............200-239 mg/dL  High........................> or = 240 mg/dL       Hemoglobin A1C   Date Value Ref Range Status   03/18/2022 5.8 (H) 4.0 - 5.6 % Final     Comment:     ADA Screening  Guidelines:  5.7-6.4%  Consistent with prediabetes  >or=6.5%  Consistent with diabetes    High levels of fetal hemoglobin interfere with the HbA1C  assay. Heterozygous hemoglobin variants (HbS, HgC, etc)do  not significantly interfere with this assay.   However, presence of multiple variants may affect accuracy.       Hemoglobin   Date Value Ref Range Status   03/18/2022 13.0 12.0 - 16.0 g/dL Final   05/25/2021 13.0 12.0 - 16.0 g/dL Final     Hematocrit   Date Value Ref Range Status   03/18/2022 40.6 37.0 - 48.5 % Final   05/25/2021 42.0 37.0 - 48.5 % Final     Vit D, 25-Hydroxy   Date Value Ref Range Status   05/25/2021 73 30 - 96 ng/mL Final     Comment:     Vitamin D deficiency.........<10 ng/mL                              Vitamin D insufficiency......10-29 ng/mL       Vitamin D sufficiency........> or equal to 30 ng/mL  Vitamin D toxicity............>100 ng/mL     12/30/2020 50 30 - 96 ng/mL Final     Comment:     Vitamin D deficiency.........<10 ng/mL                              Vitamin D insufficiency......10-29 ng/mL       Vitamin D sufficiency........> or equal to 30 ng/mL  Vitamin D toxicity............>100 ng/mL         I have reviewed the following:     Details / Date    [x]   Labs     []   Micro     []   Pathology     []   Imaging     []   Cardiology Procedures     [x]   Other  Patient's Medical and Surgical History        Assessment and Plan:     1. Annual visit for general adult medical examination without abnormal findings    --Nutrition: Discussed the importance of moderate sugar/carbohydrate intake. Adhering to a low sodium, low saturated fat/low cholesterol diet.   --Exercise: Discussed the importance of regular exercise. At least 30 minutes 5 days per week.     - Urinalysis, Reflex to Urine Culture Urine, Clean Catch; Future    2. Seasonal allergic rhinitis, unspecified trigger    Acute, medication as below.     - fluticasone propionate (FLONASE) 50 mcg/actuation nasal spray; 2 sprays (100 mcg  total) by Each Nostril route once daily at 6am.  Dispense: 16 mL; Refill: 11    3. Essential hypertension    Chronic, controlled on current medication.     - CBC Auto Differential; Future  - Comprehensive Metabolic Panel; Future  - TSH; Future    4. Impaired fasting glucose    Chronic, discussed diet and exercise.     - Hemoglobin A1C; Future  - Microalbumin/Creatinine Ratio, Urine; Future    5. Mixed hyperlipidemia    Chronic, controlled on medication.     - Lipid Panel; Future    6. Vitamin D deficiency    Chronic, stable.     - Vitamin D; Future    7. Migraine syndrome    Chronic, stable.     - rizatriptan (MAXALT-MLT) 10 MG disintegrating tablet; May repeat in 2 hours if needed  Dispense: 15 tablet; Refill: 5

## 2022-03-22 ENCOUNTER — OFFICE VISIT (OUTPATIENT)
Dept: INTERNAL MEDICINE | Facility: CLINIC | Age: 77
End: 2022-03-22
Payer: MEDICARE

## 2022-03-22 VITALS
RESPIRATION RATE: 12 BRPM | SYSTOLIC BLOOD PRESSURE: 122 MMHG | WEIGHT: 152.75 LBS | HEIGHT: 62 IN | OXYGEN SATURATION: 96 % | HEART RATE: 90 BPM | BODY MASS INDEX: 28.11 KG/M2 | DIASTOLIC BLOOD PRESSURE: 82 MMHG | TEMPERATURE: 98 F

## 2022-03-22 DIAGNOSIS — I10 ESSENTIAL HYPERTENSION: ICD-10-CM

## 2022-03-22 DIAGNOSIS — G43.909 MIGRAINE SYNDROME: ICD-10-CM

## 2022-03-22 DIAGNOSIS — E55.9 VITAMIN D DEFICIENCY: ICD-10-CM

## 2022-03-22 DIAGNOSIS — Z00.00 ANNUAL VISIT FOR GENERAL ADULT MEDICAL EXAMINATION WITHOUT ABNORMAL FINDINGS: Primary | ICD-10-CM

## 2022-03-22 DIAGNOSIS — J30.2 SEASONAL ALLERGIC RHINITIS, UNSPECIFIED TRIGGER: ICD-10-CM

## 2022-03-22 DIAGNOSIS — R73.01 IMPAIRED FASTING GLUCOSE: ICD-10-CM

## 2022-03-22 DIAGNOSIS — E78.2 MIXED HYPERLIPIDEMIA: ICD-10-CM

## 2022-03-22 PROCEDURE — 99999 PR PBB SHADOW E&M-EST. PATIENT-LVL IV: ICD-10-PCS | Mod: PBBFAC,,, | Performed by: NURSE PRACTITIONER

## 2022-03-22 PROCEDURE — 99214 OFFICE O/P EST MOD 30 MIN: CPT | Mod: PBBFAC,PO | Performed by: NURSE PRACTITIONER

## 2022-03-22 PROCEDURE — 99214 PR OFFICE/OUTPT VISIT, EST, LEVL IV, 30-39 MIN: ICD-10-PCS | Mod: S$PBB,,, | Performed by: NURSE PRACTITIONER

## 2022-03-22 PROCEDURE — 99999 PR PBB SHADOW E&M-EST. PATIENT-LVL IV: CPT | Mod: PBBFAC,,, | Performed by: NURSE PRACTITIONER

## 2022-03-22 PROCEDURE — 99214 OFFICE O/P EST MOD 30 MIN: CPT | Mod: S$PBB,,, | Performed by: NURSE PRACTITIONER

## 2022-03-22 RX ORDER — FLUTICASONE PROPIONATE 50 MCG
2 SPRAY, SUSPENSION (ML) NASAL DAILY
Qty: 16 ML | Refills: 11 | Status: SHIPPED | OUTPATIENT
Start: 2022-03-22 | End: 2023-01-11 | Stop reason: SDUPTHER

## 2022-03-22 RX ORDER — RIZATRIPTAN BENZOATE 10 MG/1
TABLET, ORALLY DISINTEGRATING ORAL
Qty: 15 TABLET | Refills: 5 | Status: SHIPPED | OUTPATIENT
Start: 2022-03-22 | End: 2022-03-22

## 2022-04-20 ENCOUNTER — PATIENT MESSAGE (OUTPATIENT)
Dept: ADMINISTRATIVE | Facility: OTHER | Age: 77
End: 2022-04-20
Payer: MEDICARE

## 2022-06-06 RX ORDER — OXYBUTYNIN CHLORIDE 10 MG/1
TABLET, EXTENDED RELEASE ORAL
Qty: 30 TABLET | Refills: 6 | Status: SHIPPED | OUTPATIENT
Start: 2022-06-06 | End: 2023-01-09 | Stop reason: SDUPTHER

## 2022-07-13 NOTE — PROGRESS NOTES
Goal Outcome Evaluation:  Small loose BMs, EGD completed, small hiatal hernia & gastritis found, plan for colonoscopy prep to start tonight & colonoscopy tomorrow, tolerating clear liquids, voiding w/o difficulty, up with assist of 1, nervous at times  Plan of Care Reviewed With: patient               Subjective:       Patient ID: Alyce Lewis is a 74 y.o. female.    Chief Complaint: Annual Exam    Patient is a 74 y.o.female who presents today for annual    Labs due now  Vaccines: pneumonia shot up to date; flu shot up to date' up to date with all injections  Mammogram:  Due in august; ordered by gyn; dr kong  Gyn exam: dr. spangler  Colonoscopy: had one in the last 2 years  DEXA: normal     Review of Systems   Constitutional: Negative for appetite change, chills, diaphoresis and fever.   HENT: Negative for congestion, ear discharge, ear pain, postnasal drip, tinnitus, trouble swallowing and voice change.    Eyes: Negative for discharge, redness and itching.   Respiratory: Negative for cough, chest tightness, shortness of breath and wheezing.    Cardiovascular: Negative for chest pain, palpitations and leg swelling.   Gastrointestinal: Negative for abdominal pain, constipation, diarrhea, nausea and vomiting.   Endocrine: Negative for cold intolerance and heat intolerance.   Genitourinary: Negative for difficulty urinating, flank pain, hematuria and urgency.   Musculoskeletal: Negative for arthralgias, gait problem, myalgias and neck stiffness.   Skin: Negative for color change and rash.   Neurological: Negative for dizziness, seizures, syncope and headaches.   Hematological: Negative for adenopathy.   Psychiatric/Behavioral: Negative for agitation, behavioral problems, confusion and sleep disturbance.       Objective:      Physical Exam   Constitutional: She is oriented to person, place, and time. She appears well-developed and well-nourished. No distress.   HENT:   Head: Normocephalic and atraumatic.   Right Ear: External ear normal.   Left Ear: External ear normal.   Nose: Nose normal.   Mouth/Throat: Oropharynx is clear and moist. No oropharyngeal exudate.   Eyes: Pupils are equal, round, and reactive to light. Conjunctivae and EOM are normal. Right eye exhibits no discharge. Left eye exhibits no  discharge. No scleral icterus.   Neck: Neck supple. No JVD present. No tracheal deviation present. No thyromegaly present.   Cardiovascular: Normal rate, normal heart sounds and intact distal pulses. Exam reveals no gallop and no friction rub.   No murmur heard.  Pulmonary/Chest: Effort normal and breath sounds normal. No stridor. No respiratory distress. She has no wheezes. She has no rales. She exhibits no tenderness.   Abdominal: Soft. Bowel sounds are normal. She exhibits no distension. There is no tenderness. There is no rebound.   Musculoskeletal: She exhibits no edema or tenderness.   Lymphadenopathy:     She has no cervical adenopathy.   Neurological: She is alert and oriented to person, place, and time.   Skin: Skin is warm and dry. No rash noted. She is not diaphoretic. No erythema.   Psychiatric: She has a normal mood and affect. Her behavior is normal.   Nursing note and vitals reviewed.      Assessment and Plan:       1. Annual physical exam    - CBC auto differential; Future  - Comprehensive metabolic panel; Future  - Lipid panel; Future  - Vitamin D; Future  - Urinalysis; Future  - TSH; Future  - Hepatitis C antibody; Future    2. Hyperlipidemia, unspecified hyperlipidemia type    - CBC auto differential; Future  - Comprehensive metabolic panel; Future  - Lipid panel; Future  - Vitamin D; Future  - Urinalysis; Future  - TSH; Future  - Hepatitis C antibody; Future    3. Essential hypertension    - CBC auto differential; Future  - Comprehensive metabolic panel; Future  - Lipid panel; Future  - Vitamin D; Future  - Urinalysis; Future  - TSH; Future  - Hepatitis C antibody; Future          No follow-ups on file.

## 2022-07-23 NOTE — TELEPHONE ENCOUNTER
No new care gaps identified.  [facility] Embedded Care Gaps. Reference number: [id number]. 7/23/2022   7:01:39 AM SOUMYA

## 2022-07-24 RX ORDER — SERTRALINE HYDROCHLORIDE 100 MG/1
TABLET, FILM COATED ORAL
Qty: 90 TABLET | Refills: 0 | Status: SHIPPED | OUTPATIENT
Start: 2022-07-24 | End: 2022-10-21 | Stop reason: SDUPTHER

## 2022-07-25 NOTE — TELEPHONE ENCOUNTER
Refill Decision Note   Alyce Lewis  is requesting a refill authorization.  Brief Assessment and Rationale for Refill:  Approve     Medication Therapy Plan:       Medication Reconciliation Completed: No   Comments:     No Care Gaps recommended.     Note composed:7:57 PM 07/24/2022

## 2022-11-14 ENCOUNTER — PATIENT MESSAGE (OUTPATIENT)
Dept: OBSTETRICS AND GYNECOLOGY | Facility: CLINIC | Age: 77
End: 2022-11-14
Payer: MEDICARE

## 2022-11-14 DIAGNOSIS — Z12.39 ENCOUNTER FOR SCREENING FOR MALIGNANT NEOPLASM OF BREAST, UNSPECIFIED SCREENING MODALITY: Primary | ICD-10-CM

## 2022-11-14 DIAGNOSIS — Z12.31 ENCOUNTER FOR SCREENING MAMMOGRAM FOR MALIGNANT NEOPLASM OF BREAST: ICD-10-CM

## 2022-11-15 ENCOUNTER — PATIENT MESSAGE (OUTPATIENT)
Dept: OTHER | Facility: OTHER | Age: 77
End: 2022-11-15
Payer: MEDICARE

## 2022-12-30 ENCOUNTER — TELEPHONE (OUTPATIENT)
Dept: INTERNAL MEDICINE | Facility: CLINIC | Age: 77
End: 2022-12-30
Payer: MEDICARE

## 2022-12-30 NOTE — TELEPHONE ENCOUNTER
Spoke to patient and she gave me permission to speak to her sone Brice .  I called brice on the requested number no answer message left

## 2022-12-30 NOTE — TELEPHONE ENCOUNTER
----- Message from Aron Basilio sent at 12/30/2022 10:53 AM CST -----  Contact: Pt son Ke   Pt's son called in requesting to speak with the nurse hs has some concerns with the pt please advise

## 2023-01-10 ENCOUNTER — TELEPHONE (OUTPATIENT)
Dept: INTERNAL MEDICINE | Facility: CLINIC | Age: 78
End: 2023-01-10
Payer: MEDICARE

## 2023-01-10 NOTE — TELEPHONE ENCOUNTER
ABIGAIL White has an apt with you tomorrow and her son, Ke,  called to inform us that she will be coming by herself, but he and his sister would like you to know that she is having some short term memory issues. He will ask her about something that happened years ago and she can tell the story, but when asked questions about resent events she has a hard time recalling everything. He said she will make excuses as to why she can't remember.   He also states that one of her grandsons reports that she was slurring words one day while he was visiting.   He hasn't noticed this.   He just wanted you to be aware so we are sure to check her meds and dosages to be sure she is taking everything correctly and to maybe check on her memory to see if she may need a referral to neurology.   He said he can be reached by phone if you need to talk to him at anytime.  His phone # [phone]    He did say he would like for you to not let her know he called bc he called another one of her dr's about this and that dr called the pt and she was upset about it. He tried to explain that they lover her and are worried and just want to be proactive.

## 2023-01-11 ENCOUNTER — OFFICE VISIT (OUTPATIENT)
Dept: INTERNAL MEDICINE | Facility: CLINIC | Age: 78
End: 2023-01-11
Payer: MEDICARE

## 2023-01-11 ENCOUNTER — LAB VISIT (OUTPATIENT)
Dept: LAB | Facility: HOSPITAL | Age: 78
End: 2023-01-11
Attending: FAMILY MEDICINE
Payer: MEDICARE

## 2023-01-11 VITALS
TEMPERATURE: 97 F | OXYGEN SATURATION: 96 % | RESPIRATION RATE: 16 BRPM | HEART RATE: 92 BPM | DIASTOLIC BLOOD PRESSURE: 84 MMHG | WEIGHT: 151.88 LBS | SYSTOLIC BLOOD PRESSURE: 132 MMHG | BODY MASS INDEX: 26.91 KG/M2 | HEIGHT: 63 IN

## 2023-01-11 DIAGNOSIS — Z23 NEED FOR PROPHYLACTIC VACCINATION AND INOCULATION AGAINST INFLUENZA: ICD-10-CM

## 2023-01-11 DIAGNOSIS — Z09 FOLLOW-UP EXAM, 7 MONTHS TO 1 YEAR SINCE PREVIOUS EXAM: Primary | ICD-10-CM

## 2023-01-11 DIAGNOSIS — F41.9 ANXIETY: ICD-10-CM

## 2023-01-11 DIAGNOSIS — G43.909 MIGRAINE SYNDROME: ICD-10-CM

## 2023-01-11 DIAGNOSIS — E55.9 VITAMIN D DEFICIENCY: ICD-10-CM

## 2023-01-11 DIAGNOSIS — E78.2 MIXED HYPERLIPIDEMIA: Chronic | ICD-10-CM

## 2023-01-11 DIAGNOSIS — C50.511 MALIGNANT NEOPLASM OF LOWER-OUTER QUADRANT OF RIGHT BREAST OF FEMALE, ESTROGEN RECEPTOR NEGATIVE: ICD-10-CM

## 2023-01-11 DIAGNOSIS — Z17.1 MALIGNANT NEOPLASM OF LOWER-OUTER QUADRANT OF RIGHT BREAST OF FEMALE, ESTROGEN RECEPTOR NEGATIVE: ICD-10-CM

## 2023-01-11 DIAGNOSIS — C43.61 MALIGNANT MELANOMA OF RIGHT UPPER EXTREMITY INCLUDING SHOULDER: ICD-10-CM

## 2023-01-11 DIAGNOSIS — R73.03 PREDIABETES: ICD-10-CM

## 2023-01-11 DIAGNOSIS — N32.81 OVERACTIVE BLADDER: ICD-10-CM

## 2023-01-11 DIAGNOSIS — I10 PRIMARY HYPERTENSION: Chronic | ICD-10-CM

## 2023-01-11 DIAGNOSIS — Z09 FOLLOW-UP EXAM, 7 MONTHS TO 1 YEAR SINCE PREVIOUS EXAM: ICD-10-CM

## 2023-01-11 DIAGNOSIS — J30.2 SEASONAL ALLERGIC RHINITIS, UNSPECIFIED TRIGGER: ICD-10-CM

## 2023-01-11 LAB
25(OH)D3+25(OH)D2 SERPL-MCNC: 41 NG/ML (ref 30–96)
ALBUMIN SERPL BCP-MCNC: 3.9 G/DL (ref 3.5–5.2)
ALP SERPL-CCNC: 68 U/L (ref 55–135)
ALT SERPL W/O P-5'-P-CCNC: 13 U/L (ref 10–44)
ANION GAP SERPL CALC-SCNC: 10 MMOL/L (ref 8–16)
AST SERPL-CCNC: 16 U/L (ref 10–40)
BASOPHILS # BLD AUTO: 0.06 K/UL (ref 0–0.2)
BASOPHILS NFR BLD: 1.2 % (ref 0–1.9)
BILIRUB SERPL-MCNC: 0.3 MG/DL (ref 0.1–1)
BUN SERPL-MCNC: 13 MG/DL (ref 8–23)
CALCIUM SERPL-MCNC: 10.3 MG/DL (ref 8.7–10.5)
CHLORIDE SERPL-SCNC: 105 MMOL/L (ref 95–110)
CHOLEST SERPL-MCNC: 184 MG/DL (ref 120–199)
CHOLEST/HDLC SERPL: 3.2 {RATIO} (ref 2–5)
CO2 SERPL-SCNC: 26 MMOL/L (ref 23–29)
CREAT SERPL-MCNC: 0.6 MG/DL (ref 0.5–1.4)
DIFFERENTIAL METHOD: ABNORMAL
EOSINOPHIL # BLD AUTO: 0.2 K/UL (ref 0–0.5)
EOSINOPHIL NFR BLD: 4 % (ref 0–8)
ERYTHROCYTE [DISTWIDTH] IN BLOOD BY AUTOMATED COUNT: 13.8 % (ref 11.5–14.5)
EST. GFR  (NO RACE VARIABLE): >60 ML/MIN/1.73 M^2
ESTIMATED AVG GLUCOSE: 126 MG/DL (ref 68–131)
GLUCOSE SERPL-MCNC: 99 MG/DL (ref 70–110)
HBA1C MFR BLD: 6 % (ref 4–5.6)
HCT VFR BLD AUTO: 40.1 % (ref 37–48.5)
HDLC SERPL-MCNC: 57 MG/DL (ref 40–75)
HDLC SERPL: 31 % (ref 20–50)
HGB BLD-MCNC: 12.6 G/DL (ref 12–16)
IMM GRANULOCYTES # BLD AUTO: 0.01 K/UL (ref 0–0.04)
IMM GRANULOCYTES NFR BLD AUTO: 0.2 % (ref 0–0.5)
LDLC SERPL CALC-MCNC: 112.4 MG/DL (ref 63–159)
LYMPHOCYTES # BLD AUTO: 1.6 K/UL (ref 1–4.8)
LYMPHOCYTES NFR BLD: 31.7 % (ref 18–48)
MCH RBC QN AUTO: 29 PG (ref 27–31)
MCHC RBC AUTO-ENTMCNC: 31.4 G/DL (ref 32–36)
MCV RBC AUTO: 92 FL (ref 82–98)
MONOCYTES # BLD AUTO: 0.5 K/UL (ref 0.3–1)
MONOCYTES NFR BLD: 10.4 % (ref 4–15)
NEUTROPHILS # BLD AUTO: 2.6 K/UL (ref 1.8–7.7)
NEUTROPHILS NFR BLD: 52.5 % (ref 38–73)
NONHDLC SERPL-MCNC: 127 MG/DL
NRBC BLD-RTO: 0 /100 WBC
PLATELET # BLD AUTO: 272 K/UL (ref 150–450)
PMV BLD AUTO: 10.3 FL (ref 9.2–12.9)
POTASSIUM SERPL-SCNC: 4.4 MMOL/L (ref 3.5–5.1)
PROT SERPL-MCNC: 6.9 G/DL (ref 6–8.4)
RBC # BLD AUTO: 4.34 M/UL (ref 4–5.4)
SODIUM SERPL-SCNC: 141 MMOL/L (ref 136–145)
T4 FREE SERPL-MCNC: 0.96 NG/DL (ref 0.71–1.51)
TRIGL SERPL-MCNC: 73 MG/DL (ref 30–150)
TSH SERPL DL<=0.005 MIU/L-ACNC: 1.31 UIU/ML (ref 0.4–4)
WBC # BLD AUTO: 5.02 K/UL (ref 3.9–12.7)

## 2023-01-11 PROCEDURE — 36415 COLL VENOUS BLD VENIPUNCTURE: CPT | Mod: PO | Performed by: NURSE PRACTITIONER

## 2023-01-11 PROCEDURE — 90694 VACC AIIV4 NO PRSRV 0.5ML IM: CPT | Mod: PBBFAC,PO

## 2023-01-11 PROCEDURE — 80053 COMPREHEN METABOLIC PANEL: CPT | Performed by: FAMILY MEDICINE

## 2023-01-11 PROCEDURE — 85025 COMPLETE CBC W/AUTO DIFF WBC: CPT | Performed by: FAMILY MEDICINE

## 2023-01-11 PROCEDURE — 99215 OFFICE O/P EST HI 40 MIN: CPT | Mod: S$PBB,,, | Performed by: FAMILY MEDICINE

## 2023-01-11 PROCEDURE — 99214 OFFICE O/P EST MOD 30 MIN: CPT | Mod: PBBFAC,PO,25 | Performed by: FAMILY MEDICINE

## 2023-01-11 PROCEDURE — 83036 HEMOGLOBIN GLYCOSYLATED A1C: CPT | Performed by: FAMILY MEDICINE

## 2023-01-11 PROCEDURE — 84439 ASSAY OF FREE THYROXINE: CPT | Performed by: FAMILY MEDICINE

## 2023-01-11 PROCEDURE — 99215 PR OFFICE/OUTPT VISIT, EST, LEVL V, 40-54 MIN: ICD-10-PCS | Mod: S$PBB,,, | Performed by: FAMILY MEDICINE

## 2023-01-11 PROCEDURE — 82306 VITAMIN D 25 HYDROXY: CPT | Performed by: NURSE PRACTITIONER

## 2023-01-11 PROCEDURE — 99999 PR PBB SHADOW E&M-EST. PATIENT-LVL IV: ICD-10-PCS | Mod: PBBFAC,,, | Performed by: FAMILY MEDICINE

## 2023-01-11 PROCEDURE — 84443 ASSAY THYROID STIM HORMONE: CPT | Performed by: FAMILY MEDICINE

## 2023-01-11 PROCEDURE — 99999 PR PBB SHADOW E&M-EST. PATIENT-LVL IV: CPT | Mod: PBBFAC,,, | Performed by: FAMILY MEDICINE

## 2023-01-11 PROCEDURE — 80061 LIPID PANEL: CPT | Performed by: FAMILY MEDICINE

## 2023-01-11 RX ORDER — IBUPROFEN 200 MG
200 TABLET ORAL EVERY 6 HOURS PRN
COMMUNITY

## 2023-01-11 RX ORDER — CYCLOBENZAPRINE HCL 10 MG
TABLET ORAL
Qty: 30 TABLET | Refills: 6 | Status: SHIPPED | OUTPATIENT
Start: 2023-01-11 | End: 2023-09-10

## 2023-01-11 RX ORDER — SERTRALINE HYDROCHLORIDE 100 MG/1
100 TABLET, FILM COATED ORAL DAILY
Qty: 30 TABLET | Refills: 11 | Status: SHIPPED | OUTPATIENT
Start: 2023-01-11 | End: 2024-04-01 | Stop reason: SDUPTHER

## 2023-01-11 RX ORDER — FLUTICASONE PROPIONATE 50 MCG
2 SPRAY, SUSPENSION (ML) NASAL DAILY
Qty: 16 ML | Refills: 11 | Status: SHIPPED | OUTPATIENT
Start: 2023-01-11 | End: 2023-05-03

## 2023-01-11 RX ORDER — BUSPIRONE HYDROCHLORIDE 15 MG/1
15 TABLET ORAL 2 TIMES DAILY
Qty: 60 TABLET | Refills: 11 | Status: SHIPPED | OUTPATIENT
Start: 2023-01-11

## 2023-01-11 RX ORDER — METOPROLOL SUCCINATE 25 MG/1
25 TABLET, EXTENDED RELEASE ORAL DAILY
Qty: 30 TABLET | Refills: 11 | Status: SHIPPED | OUTPATIENT
Start: 2023-01-11 | End: 2024-01-28

## 2023-01-11 RX ORDER — OXYBUTYNIN CHLORIDE 10 MG/1
10 TABLET, EXTENDED RELEASE ORAL DAILY
Qty: 30 TABLET | Refills: 11 | Status: SHIPPED | OUTPATIENT
Start: 2023-01-11

## 2023-01-11 RX ORDER — RIZATRIPTAN BENZOATE 10 MG/1
TABLET, ORALLY DISINTEGRATING ORAL
Qty: 9 TABLET | Refills: 11 | Status: SHIPPED | OUTPATIENT
Start: 2023-01-11 | End: 2024-01-31

## 2023-01-11 RX ORDER — SIMVASTATIN 40 MG/1
40 TABLET, FILM COATED ORAL NIGHTLY
Qty: 30 TABLET | Refills: 11 | Status: SHIPPED | OUTPATIENT
Start: 2023-01-11

## 2023-01-11 NOTE — PROGRESS NOTES
Subjective:       Patient ID: Alyce Lewis is a 77 y.o. female.    Chief Complaint: Annual Exam    HPI 77-year-old white female patient of Dr. Boles with hypertension, hyperlipidemia, prediabetes, migraines, allergic rhinitis, anxiety, overactive bladder, breast cancer at the age of 60, and melanoma removal from the right arm at the age of 64 presents to clinic today for follow-up of her general medical conditions.  Hypertension remains well controlled on metoprolol 25 mg daily.  Hyperlipidemia is stable on simvastatin 40 mg daily.  Prediabetes remains stable with diet and exercise.  She reports rare migraines for which she uses Flexeril or Maxalt with relief.  She uses Flonase nasal spray as needed for allergic rhinitis.  Anxiety remains stable on Zoloft 100 mg daily and BuSpar 15 mg twice daily.  She notes urinary frequency but states her symptoms improved after she was started on oxybutynin.  She has a past surgical history of tonsillectomy, lumpectomy, right arm melanoma removal, hand surgery, and carpal tunnel release.  She reports a family history of pancreatic cancer, heart disease, melanoma, prostate cancer, and hypertension.  She is up-to-date with all vaccinations.  Review of Systems   Constitutional:  Positive for unexpected weight change. Negative for activity change, appetite change, chills, fatigue and fever.   HENT:  Negative for nasal congestion, ear pain, hearing loss, postnasal drip, rhinorrhea, sinus pressure/congestion, sore throat, tinnitus and trouble swallowing.    Eyes:  Negative for discharge, redness, itching and visual disturbance.   Respiratory:  Negative for cough, chest tightness, shortness of breath and wheezing.    Cardiovascular:  Negative for chest pain and palpitations.   Gastrointestinal:  Negative for abdominal pain, blood in stool, constipation, diarrhea, nausea and vomiting.   Endocrine: Negative for polydipsia and polyuria.   Genitourinary:  Positive for difficulty  urinating. Negative for decreased urine volume, dysuria, frequency, hematuria, menstrual problem and urgency.   Musculoskeletal:  Negative for arthralgias, back pain, joint swelling, myalgias, neck pain and neck stiffness.   Integumentary:  Negative for rash.   Neurological:  Positive for headaches. Negative for dizziness, weakness and light-headedness.   Psychiatric/Behavioral:  Positive for confusion. Negative for dysphoric mood.        Objective:      Physical Exam  Vitals and nursing note reviewed.   Constitutional:       General: She is not in acute distress.     Appearance: She is well-developed. She is not diaphoretic.   HENT:      Head: Normocephalic and atraumatic.      Right Ear: External ear normal.      Left Ear: External ear normal.      Nose: Nose normal.      Mouth/Throat:      Pharynx: No oropharyngeal exudate.   Eyes:      General: No scleral icterus.        Right eye: No discharge.         Left eye: No discharge.      Conjunctiva/sclera: Conjunctivae normal.      Pupils: Pupils are equal, round, and reactive to light.   Neck:      Thyroid: No thyromegaly.      Vascular: No JVD.      Trachea: No tracheal deviation.   Cardiovascular:      Rate and Rhythm: Normal rate and regular rhythm.      Heart sounds: Normal heart sounds. No murmur heard.    No friction rub. No gallop.   Pulmonary:      Effort: Pulmonary effort is normal. No respiratory distress.      Breath sounds: Normal breath sounds. No stridor. No wheezing or rales.   Abdominal:      General: Bowel sounds are normal. There is no distension.      Palpations: Abdomen is soft. There is no mass.      Tenderness: There is no abdominal tenderness. There is no guarding or rebound.   Musculoskeletal:         General: No tenderness. Normal range of motion.      Cervical back: Normal range of motion and neck supple.   Lymphadenopathy:      Cervical: No cervical adenopathy.   Skin:     General: Skin is warm and dry.      Coloration: Skin is not pale.       Findings: No erythema or rash.   Neurological:      Mental Status: She is alert and oriented to person, place, and time.   Psychiatric:         Behavior: Behavior normal.         Thought Content: Thought content normal.         Judgment: Judgment normal.       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    Relevant Orders    CBC Auto Differential    Comprehensive Metabolic Panel    Lipid Panel    T4, Free    TSH    Urinalysis    Hemoglobin A1C    Malignant neoplasm of lower-outer quadrant of right breast of female, estrogen receptor negative    Relevant Orders    CBC Auto Differential    Comprehensive Metabolic Panel    Lipid Panel    T4, Free    TSH    Urinalysis    Hemoglobin A1C    Migraine syndrome    Relevant Medications    rizatriptan (MAXALT-MLT) 10 MG disintegrating tablet    Other Relevant Orders    CBC Auto Differential    Comprehensive Metabolic Panel    Lipid Panel    T4, Free    TSH    Urinalysis    Hemoglobin A1C    Overactive bladder    Prediabetes    Relevant Orders    CBC Auto Differential    Comprehensive Metabolic Panel    Lipid Panel    T4, Free    TSH    Urinalysis    Hemoglobin A1C     Other Visit Diagnoses       Follow-up exam, 7 months to 1 year since previous exam    -  Primary    Relevant Orders    CBC Auto Differential    Comprehensive Metabolic Panel    Lipid Panel    T4, Free    TSH    Urinalysis    Hemoglobin A1C    Seasonal allergic rhinitis, unspecified trigger        Relevant Medications    fluticasone propionate (FLONASE) 50  mcg/actuation nasal spray    Need for prophylactic vaccination and inoculation against influenza                  Plan:         1. CBC, CMP, UA, TSH, free T4, fasting lipids, and hemoglobin A1c.  2. Continue metoprolol 25 mg daily.  Hypertension is well controlled.  3. Continue simvastatin 40 mg nightly.  Hyperlipidemia is stable.  4. Will continue to monitor A1c.  Prediabetes remained stable with diet.    5. Patient had a previous lumpectomy secondary to right-sided breast cancer and continues to remain stable.  Mammogram scheduled.    6. Patient had a melanoma removal from the right arm and remains stable.  7. Continue use of Flexeril or Maxalt as needed.  Migraine headaches are well controlled.    8. Continue use of Flonase nasal spray as needed.  Allergic rhinitis is stable.  9. Continue Zoloft and BuSpar as prescribed.  Anxiety remains stable.  10. Continue oxybutynin 10 mg nightly.  Overactive bladder is well controlled.  11. Flu vaccine given.    12. Return to clinic as needed or in 1 year for follow-up.    40 minutes have been spent in this clinic visit.

## 2023-01-13 ENCOUNTER — TELEPHONE (OUTPATIENT)
Dept: INTERNAL MEDICINE | Facility: CLINIC | Age: 78
End: 2023-01-13
Payer: MEDICARE

## 2023-01-13 NOTE — TELEPHONE ENCOUNTER
----- Message from Cristiana Shirley sent at 1/12/2023  4:02 PM CST -----  Contact: Ke/Darrell [phone]  2TESTRESULTS    Type: Test Results    What test was performed? labs    Who ordered the test? [facility]    When and where were the test performed? [facility]1/11/23    Would you like response via [employer]: call back     Comments:

## 2023-01-13 NOTE — TELEPHONE ENCOUNTER
----- Message from Cristiana Shirley sent at 1/12/2023  4:02 PM CST -----  Contact: Ke/Darrell [phone]  2TESTRESULTS    Type: Test Results    What test was performed? labs    Who ordered the test? [facility]    When and where were the test performed? [facility]1/11/23    Would you like response via [employer]: call back     Comments:

## 2023-01-13 NOTE — TELEPHONE ENCOUNTER
Called pt's son, brice, and gave him lab results  He VU    He asked it Dr Small evaluated the memory issues and I told him that I would ask and let him know    Dr Small said he did and everything is fine

## 2023-01-23 ENCOUNTER — HOSPITAL ENCOUNTER (OUTPATIENT)
Dept: RADIOLOGY | Facility: HOSPITAL | Age: 78
Discharge: HOME | End: 2023-01-23
Attending: OBSTETRICS & GYNECOLOGY
Payer: MEDICARE

## 2023-01-23 DIAGNOSIS — Z12.31 ENCOUNTER FOR SCREENING MAMMOGRAM FOR MALIGNANT NEOPLASM OF BREAST: ICD-10-CM

## 2023-01-23 DIAGNOSIS — Z12.39 ENCOUNTER FOR SCREENING FOR MALIGNANT NEOPLASM OF BREAST, UNSPECIFIED SCREENING MODALITY: ICD-10-CM

## 2023-01-23 PROCEDURE — 77063 MAMMO DIGITAL SCREENING BILAT WITH TOMO: ICD-10-PCS | Mod: 26,,, | Performed by: RADIOLOGY

## 2023-01-23 PROCEDURE — 77067 SCR MAMMO BI INCL CAD: CPT | Mod: 26,,, | Performed by: RADIOLOGY

## 2023-01-23 PROCEDURE — 77067 SCR MAMMO BI INCL CAD: CPT | Mod: TC

## 2023-01-23 PROCEDURE — 77063 BREAST TOMOSYNTHESIS BI: CPT | Mod: 26,,, | Performed by: RADIOLOGY

## 2023-01-23 PROCEDURE — 77067 MAMMO DIGITAL SCREENING BILAT WITH TOMO: ICD-10-PCS | Mod: 26,,, | Performed by: RADIOLOGY

## 2023-01-28 ENCOUNTER — LAB VISIT (OUTPATIENT)
Dept: LAB | Facility: HOSPITAL | Age: 78
End: 2023-01-28
Attending: FAMILY MEDICINE
Payer: MEDICARE

## 2023-01-28 DIAGNOSIS — Z17.1 MALIGNANT NEOPLASM OF LOWER-OUTER QUADRANT OF RIGHT BREAST OF FEMALE, ESTROGEN RECEPTOR NEGATIVE: ICD-10-CM

## 2023-01-28 DIAGNOSIS — G43.909 MIGRAINE SYNDROME: ICD-10-CM

## 2023-01-28 DIAGNOSIS — I10 PRIMARY HYPERTENSION: Chronic | ICD-10-CM

## 2023-01-28 DIAGNOSIS — R73.03 PREDIABETES: ICD-10-CM

## 2023-01-28 DIAGNOSIS — C50.511 MALIGNANT NEOPLASM OF LOWER-OUTER QUADRANT OF RIGHT BREAST OF FEMALE, ESTROGEN RECEPTOR NEGATIVE: ICD-10-CM

## 2023-01-28 DIAGNOSIS — C43.61 MALIGNANT MELANOMA OF RIGHT UPPER EXTREMITY INCLUDING SHOULDER: ICD-10-CM

## 2023-01-28 DIAGNOSIS — E78.2 MIXED HYPERLIPIDEMIA: Chronic | ICD-10-CM

## 2023-01-28 DIAGNOSIS — Z09 FOLLOW-UP EXAM, 7 MONTHS TO 1 YEAR SINCE PREVIOUS EXAM: ICD-10-CM

## 2023-01-28 LAB
BILIRUB UR QL STRIP: NEGATIVE
CAOX CRY UR QL COMP ASSIST: ABNORMAL
CLARITY UR REFRACT.AUTO: CLEAR
COLOR UR AUTO: YELLOW
GLUCOSE UR QL STRIP: NEGATIVE
HGB UR QL STRIP: NEGATIVE
KETONES UR QL STRIP: NEGATIVE
LEUKOCYTE ESTERASE UR QL STRIP: ABNORMAL
MICROSCOPIC COMMENT: ABNORMAL
NITRITE UR QL STRIP: NEGATIVE
PH UR STRIP: 6 [PH] (ref 5–8)
PROT UR QL STRIP: NEGATIVE
RBC #/AREA URNS AUTO: 3 /HPF (ref 0–4)
SP GR UR STRIP: 1.01 (ref 1–1.03)
SQUAMOUS #/AREA URNS AUTO: 1 /HPF
URN SPEC COLLECT METH UR: ABNORMAL
WBC #/AREA URNS AUTO: 9 /HPF (ref 0–5)

## 2023-01-28 PROCEDURE — 81001 URINALYSIS AUTO W/SCOPE: CPT | Performed by: FAMILY MEDICINE

## 2023-01-31 ENCOUNTER — OFFICE VISIT (OUTPATIENT)
Dept: OBSTETRICS AND GYNECOLOGY | Facility: CLINIC | Age: 78
End: 2023-01-31
Payer: MEDICARE

## 2023-01-31 VITALS
BODY MASS INDEX: 27.24 KG/M2 | WEIGHT: 153.75 LBS | DIASTOLIC BLOOD PRESSURE: 88 MMHG | SYSTOLIC BLOOD PRESSURE: 128 MMHG

## 2023-01-31 DIAGNOSIS — Z01.419 WELL WOMAN EXAM WITH ROUTINE GYNECOLOGICAL EXAM: Primary | ICD-10-CM

## 2023-01-31 PROCEDURE — 99213 OFFICE O/P EST LOW 20 MIN: CPT | Mod: PBBFAC,PO | Performed by: OBSTETRICS & GYNECOLOGY

## 2023-01-31 PROCEDURE — 99999 PR PBB SHADOW E&M-EST. PATIENT-LVL III: ICD-10-PCS | Mod: PBBFAC,,, | Performed by: OBSTETRICS & GYNECOLOGY

## 2023-01-31 PROCEDURE — G0101 CA SCREEN;PELVIC/BREAST EXAM: HCPCS | Mod: PBBFAC,PO | Performed by: OBSTETRICS & GYNECOLOGY

## 2023-01-31 PROCEDURE — 99999 PR PBB SHADOW E&M-EST. PATIENT-LVL III: CPT | Mod: PBBFAC,,, | Performed by: OBSTETRICS & GYNECOLOGY

## 2023-01-31 PROCEDURE — 88175 CYTOPATH C/V AUTO FLUID REDO: CPT | Performed by: OBSTETRICS & GYNECOLOGY

## 2023-01-31 PROCEDURE — G0101 CA SCREEN;PELVIC/BREAST EXAM: HCPCS | Mod: S$PBB,,, | Performed by: OBSTETRICS & GYNECOLOGY

## 2023-01-31 PROCEDURE — G0101 PR CA SCREEN;PELVIC/BREAST EXAM: ICD-10-PCS | Mod: S$PBB,,, | Performed by: OBSTETRICS & GYNECOLOGY

## 2023-01-31 NOTE — PROGRESS NOTES
HPI:   77 y.o.   OB History          2    Para   2    Term   2            AB        Living             SAB        IAB        Ectopic        Multiple        Live Births                  No LMP recorded. Patient is postmenopausal.    Patient is  here for her annual gynecologic exam.  She has no complaints.     ROS:  GENERAL: No fever, chills, fatigability or weight loss.  SKIN: No rashes, itching or changes in color or texture of skin.  HEAD: No headaches or recent head trauma.  EYES: Visual acuity fine. No photophobia, ocular pain or diplopia.  EARS: Denies ear pain, discharge or vertigo.  NOSE: No loss of smell, no epistaxis or postnasal drip.  MOUTH & THROAT: No hoarseness or change in voice. No excessive gum bleeding.  NODES: Denies swollen glands.  CHEST: Denies FELIX, cyanosis, wheezing, cough and sputum production.  CARDIOVASCULAR: Denies chest pain, PND, orthopnea or reduced exercise tolerance.  ABDOMEN: Appetite fine. No weight loss. Denies diarrhea, abdominal pain, hematemesis or blood in stool.  URINARY: No flank pain, dysuria or hematuria.  PERIPHERAL VASCULAR: No claudication or cyanosis.  MUSCULOSKELETAL: No joint stiffness or swelling. Denies back pain.  NEUROLOGIC: No history of seizures, paralysis, alteration of gait or coordination.    PE:   /88   Wt 69.7 kg (153 lb 12.3 oz)   BMI 27.24 kg/m²   APPEARANCE: Well nourished, well developed, in no acute distress.  NECK: Neck symmetric without masses or thyromegaly.  BREASTS: Symmetrical, no skin changes or visible lesions. No palpable masses, nipple discharge or adenopathy bilaterally.  ABDOMEN: Flat. Soft. No tenderness or masses. No hepatosplenomegaly. No hernias. No CVA tenderness.  VULVA: No lesions. Normal female genitalia.  URETHRAL MEATUS: Normal size and location, no lesions, no prolapse.  URETHRA: No masses, tenderness, prolapse or scarring.  VAGINA: Moist and well rugated, no discharge, no significant cystocele or  rectocele.  CERVIX: No lesions and discharge. PAP done.  UTERUS: Normal size, regular shape, mobile, non-tender, bladder base nontender.  ADNEXA: No masses, tenderness or CDS nodularity.  ANUS PERINEUM: Normal.    PROCEDURES:  Pap smear    Assessment:  Normal Gynecologic Exam    Plan:  Mammogram and Colonoscopy if indicated by current recommendations.  Return to clinic in one year or for any problems or complaints.  No gyn co

## 2023-02-15 ENCOUNTER — OFFICE VISIT (OUTPATIENT)
Dept: URGENT CARE | Facility: CLINIC | Age: 78
End: 2023-02-15
Payer: MEDICARE

## 2023-02-15 VITALS
DIASTOLIC BLOOD PRESSURE: 87 MMHG | OXYGEN SATURATION: 95 % | HEART RATE: 95 BPM | BODY MASS INDEX: 27.23 KG/M2 | RESPIRATION RATE: 16 BRPM | HEIGHT: 63 IN | TEMPERATURE: 99 F | WEIGHT: 153.69 LBS | SYSTOLIC BLOOD PRESSURE: 130 MMHG

## 2023-02-15 DIAGNOSIS — M79.642 HAND PAIN, LEFT: ICD-10-CM

## 2023-02-15 DIAGNOSIS — M18.12 OSTEOARTHRITIS OF CARPOMETACARPAL (CMC) JOINT OF LEFT THUMB, UNSPECIFIED OSTEOARTHRITIS TYPE: Primary | ICD-10-CM

## 2023-02-15 PROCEDURE — 99213 OFFICE O/P EST LOW 20 MIN: CPT | Mod: S$GLB,,, | Performed by: NURSE PRACTITIONER

## 2023-02-15 PROCEDURE — 73130 XR HAND COMPLETE 3 VIEW LEFT: ICD-10-PCS | Mod: FY,LT,S$GLB, | Performed by: RADIOLOGY

## 2023-02-15 PROCEDURE — 99213 PR OFFICE/OUTPT VISIT, EST, LEVL III, 20-29 MIN: ICD-10-PCS | Mod: S$GLB,,, | Performed by: NURSE PRACTITIONER

## 2023-02-15 PROCEDURE — 73130 X-RAY EXAM OF HAND: CPT | Mod: FY,LT,S$GLB, | Performed by: RADIOLOGY

## 2023-02-15 RX ORDER — METHYLPREDNISOLONE 4 MG/1
TABLET ORAL
Qty: 21 EACH | Refills: 0 | Status: SHIPPED | OUTPATIENT
Start: 2023-02-15 | End: 2023-03-08

## 2023-02-15 NOTE — PATIENT INSTRUCTIONS
Rest the hand  Alternate heating pads with ice packs  Ace wrap or wrist splint for comfort  Elevate the hand when at rest  Wrist exercises  Aleve for pain- ok to alternate with tylenol

## 2023-02-15 NOTE — PROGRESS NOTES
"Subjective:       Patient ID: Alyce Lewis is a 77 y.o. female.    Vitals:  height is 5' 3" (1.6 m) and weight is 69.7 kg (153 lb 10.6 oz). Her oral temperature is 99.2 °F (37.3 °C). Her blood pressure is 130/87 and her pulse is 95. Her respiration is 16 and oxygen saturation is 95%.     Chief Complaint: Hand Pain    78 y/o female presents to  today with c/o left hand pain x2 days, with swelling to the thumb, and pain radiating up the left arm. Has had some numbness to the area. She has h/o carpal tunnel, but reports that this pain feels different. Taking advil for relief. Sharp pain occurred to the left arm, overnight-which was worrisome. She denied chest pain, neck pain, jaw pain. Denies injury.     Hand Pain   The incident occurred 2 days ago. The incident occurred at home. There was no injury mechanism. The pain is present in the left hand. The quality of the pain is described as aching. The pain radiates to the left arm. The pain is at a severity of 8/10. The pain is severe. The pain has been Constant since the incident. Associated symptoms include numbness. The symptoms are aggravated by movement.   Musculoskeletal:  Positive for joint pain and joint swelling.   Neurological:  Positive for numbness.     Objective:      Physical Exam   Constitutional: She is oriented to person, place, and time.  Non-toxic appearance. She does not appear ill. No distress.   HENT:   Head: Normocephalic.   Nose: Nose normal.   Mouth/Throat: Mucous membranes are moist.   Eyes: Right eye exhibits no discharge. Left eye exhibits no discharge.   Neck: Neck supple. No neck rigidity present.   Cardiovascular: Normal rate and normal pulses.   Pulmonary/Chest: Effort normal.   Abdominal: Normal appearance.   Musculoskeletal:         General: Swelling and tenderness present. No deformity or signs of injury.      Comments: Swelling noted to 1st metacarpal. Swelling over the DIPs. Positive tenderness with compression over the median " nerve.    Neurological: She is alert and oriented to person, place, and time.   Skin: Skin is warm, dry and not diaphoretic. Capillary refill takes less than 2 seconds.   Psychiatric: Her behavior is normal.   Nursing note and vitals reviewed.    XR HAND COMPLETE 3 VIEW LEFT    Result Date: 2/15/2023  EXAMINATION: XR HAND COMPLETE 3 VIEW LEFT CLINICAL HISTORY: . Pain in left hand TECHNIQUE: PA, lateral, and oblique views of the left hand were performed. COMPARISON: None FINDINGS: DJD mostly involving the 1st carpometacarpal articulation and the D IP joints.  No fracture or dislocation.  No bone destruction identified     See above Electronically signed by: Kaveh Costa MD Date:    02/15/2023 Time:    10:17    Mammo Digital Screening Bilat w/ Juan J    Result Date: 1/28/2023  Result: Mammo Digital Screening Bilat w/ Juan J  History: Patient is 77 y.o. and is seen for a screening mammogram. Films Compared: Prior images were compared.  Findings: This procedure was performed using tomosynthesis. Computer-aided detection was utilized in the interpretation of this examination. The breasts are heterogeneously dense, which may obscure small masses. Right There are post-surgical findings from a previous lumpectomy seen in the right breast. There has been no interval development of a suspicious mass, microcalcification, or architectural distortion. Left There is no evidence of suspicious masses, calcifications, or other abnormal findings in the left breast. No detrimental change.     Bilateral There is no mammographic evidence of malignancy. BI-RADS Category: Overall: 2 - Benign  Recommendation: Routine screening mammogram in 1 year is recommended.     Assessment:       1. Osteoarthritis of carpometacarpal (CMC) joint of left thumb, unspecified osteoarthritis type    2. Hand pain, left          Plan:         Osteoarthritis of carpometacarpal (CMC) joint of left thumb, unspecified osteoarthritis type    Hand pain, left  -     XR  HAND COMPLETE 3 VIEW LEFT; Future; Expected date: 02/15/2023  -     Ambulatory referral/consult to Hand Surgery    Other orders  -     methylPREDNISolone (MEDROL DOSEPACK) 4 mg tablet; use as directed  Dispense: 21 each; Refill: 0      Patient Instructions   Rest the hand  Alternate heating pads with ice packs  Ace wrap or wrist splint for comfort  Elevate the hand when at rest  Wrist exercises  Aleve for pain- ok to alternate with tylenol

## 2023-05-03 DIAGNOSIS — J30.2 SEASONAL ALLERGIC RHINITIS, UNSPECIFIED TRIGGER: ICD-10-CM

## 2023-05-03 RX ORDER — FLUTICASONE PROPIONATE 50 MCG
SPRAY, SUSPENSION (ML) NASAL
Qty: 16 ML | Refills: 11 | Status: SHIPPED | OUTPATIENT
Start: 2023-05-03

## 2023-05-10 ENCOUNTER — TELEPHONE (OUTPATIENT)
Dept: NEUROLOGY | Facility: CLINIC | Age: 78
End: 2023-05-10
Payer: MEDICARE

## 2023-05-10 NOTE — TELEPHONE ENCOUNTER
----- Message from Jim Rivas MA sent at 5/10/2023  3:55 PM CDT -----  Contact: [phone]  Patient's daughter Monae Silva is calling to schedule appt with Dr. Suri Jim. She stated she was referred by some of her friends for the patient to see her.  Monae would like to be reached at  [phone].

## 2023-05-12 NOTE — TELEPHONE ENCOUNTER
Called pt's daughter back, Monae Silva, with the number she had left since I realized it was a different number than listed in the chart. No answer, left vm.

## 2023-05-12 NOTE — TELEPHONE ENCOUNTER
Called pt regarding missed call from pt's daughter Monae to gather more details about the referral to Suri Jim's office, but no answer. Left vm.

## 2023-05-17 ENCOUNTER — TELEPHONE (OUTPATIENT)
Dept: NEUROLOGY | Facility: CLINIC | Age: 78
End: 2023-05-17
Payer: MEDICARE

## 2023-05-17 NOTE — TELEPHONE ENCOUNTER
----- Message from Jim Rivas MA sent at 5/17/2023  8:46 AM CDT -----  Contact: [phone]  Pt's daughter Monae is requesting to speak with Nas. Please reach back out to pt's daughter at [phone].

## 2023-05-18 ENCOUNTER — TELEPHONE (OUTPATIENT)
Dept: NEUROLOGY | Facility: CLINIC | Age: 78
End: 2023-05-18
Payer: MEDICARE

## 2023-05-18 NOTE — TELEPHONE ENCOUNTER
----- Message from Foster Queen sent at 5/18/2023 12:46 PM CDT -----  Regarding: adv  Contact: @[phone]  Pt daughter  calling to speak with fredrick in regards to a appt.. pls call and adv@[phone]

## 2023-05-19 NOTE — TELEPHONE ENCOUNTER
Called pt's daughter back regarding her mother. She stated that it was regarding her mother's recent memory loss. Pt's daughter stated that they do not have a formal referral from a doctor, but a family friend, a nurse practitioner at [facility], had recommended Suri Jim for memory. She believes the memory loss may be in the early stages. Unsure if this would be memory clinic or for general neurology. Sending to Robyn for follow up.

## 2023-05-23 ENCOUNTER — TELEPHONE (OUTPATIENT)
Dept: NEUROLOGY | Facility: CLINIC | Age: 78
End: 2023-05-23
Payer: MEDICARE

## 2023-07-02 NOTE — PROGRESS NOTES
NEUROPSYCHOLOGY CONSULT   Referral Information  Name: Alyce Lewis  MRN: [id number]  : 1945  Age: 77 y.o.  Race: White  Gender: female  REFERRAL SOURCE: Suri Jim NP  DATE CONDUCTED: 2023  SOURCES OF INFORMATION:  The following was gathered from a clinical interview with Ms. Alyce Lewis, her daughter, and review of the available medical records. Ms. Lewis expressed an understanding of the purpose of the evaluation and consented to all procedures. Total licensed billing psychologists professional time including clinical interview, test administration and interpretation of tests administered by the billing psychologist, integration of test results and other clinical data, preparing the final report, and personally reporting results to the patient   [place] - 60 minutes, [id number]/[id number] - 120 minutes, [id number] - 67 minutes    NEUROPSYCHOLOGICAL EVALUATION - CONFIDENTIAL    SUMMARY/TREATMENT PLAN   Ms. Lewis is a 77 year old female in the setting of family reported progressive cognitive decline over at least the past year. She resides independently, but her 2 children have become more involved in complex ADLs due to concerns about her memory. Her daughter (Monae) began managing her appointments after learning that she had not seen her PCP in 2 years. Ms. Lewis continues to independently manage her medications, but Monae is unable to confirm adherence. Ms. Lewis also independently manages her finances, but seemed to think that Monae was managing them during the intake.     While Ms. Lewis's current functioning is at the level of mild cognitive impairment (MCI), neuropsychological test results were closer to mild dementia. It may be that her use of compensatory mechanisms to helping to mitigate the impact of cognitive decline on her daily functioning. Testing revealed global cognitive decline, including changes in learning/memory, executive functioning, language, and visuospatial  abilities. Etiology is unclear at this time as she is scheduled for a brain MRI and labs. In the absence of any noteworthy findings, there would be a high index of suspicion for a neurodegenerative condition. While she does not currently present with the loss of episodic memory seen in Alzheimer's disease, her reduced learning/memory with anosognosia is concerning. No parkinsonism per Dr. Jim. Several recommendations are offered.     Diagnoses  Problem List Items Addressed This Visit          Neuro    Mild cognitive impairment    Current Assessment & Plan     Level of Care: Family may want to provide more medication and financial oversight to ensure 100% adherence.     Future Care Planning: Establish POA and discuss future care preferences as she may require a higher level of support in the future.     Medication Considerations: Oxybutynin and Flexeril are anticholingeric medications that can impact memory.     Neuropsychiatric Symptoms: Irritability and anxiety may be points of intervention.     Follow-up: RTC on 8/2 to review all test results.           Other Visit Diagnoses       Other amnesia    -  Primary    Relevant Orders    Vitamin B1    Vitamin B12    FOLATE    RPR    MRI Brain Without Contrast           Ms. Lewis will be provided the results of the evaluation.     Thank you for allowing me to participate in Ms. [place] care.  If you have any questions, please contact me at [phone].    Raphael Pina Psy.D., ABPP  Board Certified in Clinical Neuropsychology  Department of Neurology    HISTORY OF PRESENT ILLNESS: Ms. Alyce Lewis is a 77 y.o., left-handed, female with 16 years of education who was referred for a neuropsychological evaluation in the setting of family reported progressive cognitive decline. Ms. Lewis indicated that she is not worried about her cognition, but she is aware that her children have expressed concern. She did note being forgetful, but could not provide any examples of  "memory loss. In contrast, her daughter Monae has noticed short-term memory decline over the past year. She is prone to forgetting conversations with unclear benefit from cueing as Monae notes that her mother tends to "play it off" as though she remembers. She will also either make a joke or lean on very familiar stories when in conversation. She finds that her mother is more reliant on compensatory mechanisms, such as writing things down/taking notes. She recently forgot an alarm code, which has been the same code for 15 years. She does not recall a specific example of her mother forgetting a recent event, but she does suspect some loss of episodic memory. Still, she has not noticed complete loss of episodic memory. She has not observed any instances of poor judgment/decision making. At this point, Monae feels that her mother's resistance to help is the biggest caregiver challenge. Ms. Lewis has always been a very independent/private individual, so Monae and her brother are mindful about how they can support their mother without her feeling that they are trying to strip her of independence.     Neuropsychiatric Symptoms:  Hallucinations: Denied  Irritability/Agitation: Endorsed, by daughter, who notes that she is more easily frustrated than in the past, typically directed toward Monae. She less irritable towards her son, so Monae tries to have him be the messenger for difficult conversations.   Depression/Labile Mood: Endorsed, longstanding. She recalled feeling very down after being "put out" of the [employer] in her 20's due to being told that she "didn't have what it took." She has been prescribed an antidepressant since her divorce over 30 years ago. She doesn't feel that her mood is any worse than usual, but noted that life can feel "empty" at times. Monae calls her regularly, but she tends to feel left out socially by her former colleagues. As a result, she spends a lot of her time alone. She indicated " "that she likes to read and described a book she is currently reading in detail, even noting where she bought the book (with her sister). However, her daughter noted that the instance she was referring to occurred years ago. She denied active SI, plan, or intent, endorsing Restoration as a protective factor.   Anxiety: Ms. Lewis reported feeling anxious when talking with Monae, feeling as though she "pushes" some things onto her. She seemed to suggest that Monae provides feedback/suggestions that she doesn't want to hear/do. Monae has noticed increased anxiety, including in social situations, finding that she has trouble making small talk. She seems to rely on retelling old stories rather than discussing any sort of current events.     DAILY FUNCTIONING:  BASIC ADLS:  Feeding: independent, she eats minimal protein per her daughter, who notes that she mainly eats vegetables and ice cream. Ms. Lewis indicated that she doesn't have much on an appetite in the evening, so she typically eats ice cream. Weight is stable.   Dressing: independent  Bathing: independent, no change in hygiene.     IADLS:  Support System: She resides independently. Both children are local.   Appointment Management: Children began managing after becoming aware that she hadn't seen her PCP in 2 years. Ms. Lewis writes down the appointments after her children schedule.   Medication Compliance: independent, she fills a pillbox and reported strong adherence. Monae hasn't checked on accuracy, so difficult to comment on adherence.   Financial Management: independent with no apparent issues. Ms. Lewis did seem to think that her daughter was overseeing her finances, but Monae indicated that this is not the case.   Cooking: independent, primarily simple meal preparation. No safety issues.    Driving: She continues to drive with no reported problems. She avoids driving at night. No recent MVCs. Monae is comfortable with her driving at the present " time, noting that she is able to track her with GPS.     BRAIN HEALTH RISK FACTORS:  Hearing Loss: Denied, no hearing aides.   Falls: Denied  Sleep: She denied problems with sleep initiaton/maintanenace. No dream enactment behavior observed by daughter. She can have vivid dreams.     MEDICAL HISTORY: Ms. Lewis  has a past medical history of BRCA1 positive, Breast cancer (60 years old, resection, radiation, and estrogen modulator), Hyperlipidemia, Melanoma (removed at 64 years old), Migraine, and Usual hyperplasia of lactiferous duct.    NEUROIMAGING: None on file.     SUBSTANCE USE: Ms. Lewis  reports that she has never smoked. She has never used smokeless tobacco. She reports that she does not drink alcohol and does not use drugs.    CURRENT MEDICATIONS:    Current Outpatient Medications:     busPIRone (BUSPAR) 15 MG tablet, Take 1 tablet (15 mg total) by mouth 2 (two) times daily., Disp: 60 tablet, Rfl: 11    cyclobenzaprine (FLEXERIL) 10 MG tablet, TAKE 1 TABLET BY MOUTH 2 TIMES DAILY AS NEEDED FOR MUSCLE SPASMS., Disp: 30 tablet, Rfl: 6    fluticasone propionate (FLONASE) 50 mcg/actuation nasal spray, 2 SPRAYS (100 MCG TOTAL) BY EACH NOSTRIL ROUTE ONCE DAILY AT 6AM., Disp: 16 mL, Rfl: 11    ibuprofen (ADVIL,MOTRIN) 200 MG tablet, Take 200 mg by mouth every 6 (six) hours as needed for Pain., Disp: , Rfl:     metoprolol succinate (TOPROL-XL) 25 MG 24 hr tablet, Take 1 tablet (25 mg total) by mouth once daily., Disp: 30 tablet, Rfl: 11    oxybutynin (DITROPAN-XL) 10 MG 24 hr tablet, Take 1 tablet (10 mg total) by mouth once daily., Disp: 30 tablet, Rfl: 11    rizatriptan (MAXALT-MLT) 10 MG disintegrating tablet, May repeat in 2 hours if needed, Disp: 9 tablet, Rfl: 11    sertraline (ZOLOFT) 100 MG tablet, Take 1 tablet (100 mg total) by mouth once daily., Disp: 30 tablet, Rfl: 11    simvastatin (ZOCOR) 40 MG tablet, Take 1 tablet (40 mg total) by mouth every evening., Disp: 30 tablet, Rfl: 11     FAMILY  HISTORY: family history includes Breast cancer in her mother; Heart attack in her father; Hypertension in her maternal grandmother, sister, and sister; Melanoma in her sister; Pancreatic cancer in her mother; Prostate cancer in her brother. No known family history of dementia. Her oldest sister has hydrocephalus.     PSYCHOSOCIAL HISTORY:   Education:   Level Attained: Bachelor's degree from [employer].    Learning Difficulties: Denied   Repeated Grade: Denied     Vocation:   Highest Attained: When asked her longest profession, she initially stated that she was in the [employer]. However, this was for a brief amount of time. Her daughter prompted her that she had a much longer job, to which Ms. 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   Retired: 75    Relationship Status:   : No   : Yes   Children: 2 (1 son, 1 daughter)    MENTAL STATUS AND OBSERVATIONS:  APPEARANCE: Appropriately dressed/groomed.   ALERTNESS/ORIENTATION: Attentive and alert.   GAIT/MOTOR: Ambulated independently.   SENSORY: Unremarkable.   SPEECH/LANGUAGE: Normal in rate, rhythm, tone, and volume. Expressive and receptive language were grossly intact.  STATED MOOD/AFFECT: Mood was euthymic.   INTERPERSONAL BEHAVIOR: Rapport was quickly and easily established   THOUGHT PROCESSES: Thoughts seemed logical and goal-directed.    BEHAVIORAL OBSERVATIONS: She required frequent repetition of test instructions.      APPENDIX/TEST RESULTS:  TESTS ADMINISTERED:  Clinical Interview and Review of Records, Sage Cognitive Assessment (MoCA), Repeatable Battery for the Assessment of Neuropsychological Status (RBANS), Naming subtest from the NAB, Ranjit Complex Figure (Copy Trial), and the Geriatric Depression Scale (GDS).      Score Label T-Score Standard Score Z-Score Scaled Score %ile Rank   Exceptionally High > 70 > 130 > 2.0  > 16 > 98    Above Average 64-69 120-129 1.4-1.9 15 91-97   High Average 57-63 110-119 0.7-1.3 12-14 75-90   Average 44-56  0.6 to -0.6 8-11 25-74   Low Average 37-43 80-89 -1.3 to -0.7 6-7 9-24   Below Average 30-36 70-79 -2.0 to -1.4 4-5 2-8   Exceptionally Low < 30 < 70  < -2.0 < 4 < 2      Mental Status: She was 1 off the day of the week. She was oriented to the month, date, and year. She correctly stated the hospital, but stated she was 1 city over.   7/2023 MoCA = 14/30     Pre-morbid/Baseline: Estimated to be in the average range based on educational/vocational attainment. A composite score of her overall cognitive abilities was in the exceptionally low range.      Language: Letter verbal fluency was below expectation. Below average performance on a test of semantic verbal fluency. Below average performance on a test of confrontation naming. Her performance improved with cueing.      Visuospatial: Poor number spacing on her drawing of a clock face, but all were included. She did not correctly set the clock hands. Below average performance on a test of line orientation/visual acuity. Inefficient approach to her copy of a complex figure, which seemed to contribute to several errors. Specifically, she lyric the outside contour as a triangle as opposed to a rectangle. While several details appeared correctly placed, it was unclear if she fully appreciated the gestalt given her initial approach. Overall, her drawing was suggestive of possible mild visuospatial dysfunction.      Learning/Memory: Overall encoding of a supraspan word list was exceptionally low as she recalled 1, 5, 3, and 3 of 10 words. She did not freely recall any words following a long delay. Recognition was exceptionally low (8/10 hits, 3 fps). She encoded 4/5 words after the first trial on the MoCA, but only 2 after the second trial. She did not freely recall any words following a brief delay. She recalled 3 words with categorical cueing and correctly  identified 1 word with multiple choice cueing. Overall encoding of a short story was below average. She correctly recalled 1 detail following a long delay, providing several incorrect details. Overall recall was exceptionally low. Incidental visual memory for a previously copied figure was average.     Executive Functioning: One trial learning/encoding was below expectation. She provided 3/5 correct responses on a serial 7 subtraction task. Low average performance on a test of processing speed.      Mood: She did not endorse clinical depression.       Raw Score Type of Standardized Score Standardized Score Percentile/CP   RBANS EI 0 - -    COGNITIVE SCREENING Raw Score Type of Standardized Score Standardized Score Percentile/CP   MoCA 14 - - -   Orientation - Place 2/2 - - -   Orientation - Date 3/4 - - -   RBANS       Immediate Memory - SS 57 0.2   VS/Construction - SS 78 7   Language - SS 90 25   Attention - SS 91 27   Delayed Memory - SS 60 0.4   Total Scale - SS 68 2   Subtests       List Learning 12 ss 2 0   Story Memory 9 ss 4 2   Figure Copy 16 ss 7 16   Line Orientation 12 ss - 3-9   Naming 10 ss - 51-75   Fluency 14 ss 6 9   Digit Span 10 ss 10 50   Coding 31 ss 7 16   List Recall 0 ss - <2   List Recognition 15 ss - <2   Story Recall 1 ss 2 0   Figure Recall 11 ss 9 37   Story Recognition  8 ss - 8-15   Figure Recognition 0 - - -   LANGUAGE FUNCTIONING Raw Score Type of Standardized Score Standardized Score Percentile/CP   RBANS Naming 10 ss - 51-75   RBANS Semantic Fluency 14 ss 6 9   NAB Naming 27 Tscore 35 7   NAB Naming Percent Correct After Semantic Cuing 50 - - 71   NAB Naming Percent Correct After Phonemic Cuing 100 - - 100   VISUOSPATIAL FUNCTIONING Raw Score Type of Standardized Score Standardized Score Percentile/CP   RBANS Line Orientation  12 ss - 3-9   RBANS Figure Copy 16 ss 7 16   RCFT Copy 23 - - <1   RCFT Time to Copy 340 - - >16   LEARNING & MEMORY Raw Score Type of Standardized Score  Standardized Score Percentile/CP   RBANS       Immediate Memory - SS 57 0.2   Delayed Memory - SS 60 0.4   List Learning 12 ss 2 0   List Recall 0 ss - <2   List Recognition 15 ss - <2   Story Memory 9 ss 4 2   Story Recall 1 ss 2 0.4   Story Recognition  8 - - 8-15   Figure Recall 11 ss 9 37   Figure Recognition 0 - - -   ATTENTION/WORKING MEMORY Raw Score Type of Standardized Score Standardized Score Percentile/CP   RBANS Digit Span  10 ss 10 50   MENTAL PROCESSING SPEED Raw Score Type of Standardized Score Standardized Score Percentile/CP   RBANS Coding 31 ss 7 16   MOOD & PERSONALITY Raw Score Type of Standardized Score Standardized Score Percentile/CP   GDS-30 6 - - -

## 2023-07-05 ENCOUNTER — OFFICE VISIT (OUTPATIENT)
Dept: NEUROLOGY | Facility: CLINIC | Age: 78
End: 2023-07-05
Payer: MEDICARE

## 2023-07-05 VITALS
HEIGHT: 62 IN | BODY MASS INDEX: 28.34 KG/M2 | DIASTOLIC BLOOD PRESSURE: 78 MMHG | SYSTOLIC BLOOD PRESSURE: 154 MMHG | WEIGHT: 154 LBS | HEART RATE: 94 BPM

## 2023-07-05 DIAGNOSIS — R73.03 PREDIABETES: ICD-10-CM

## 2023-07-05 DIAGNOSIS — G31.84 MILD COGNITIVE IMPAIRMENT: ICD-10-CM

## 2023-07-05 DIAGNOSIS — E78.2 MIXED HYPERLIPIDEMIA: Chronic | ICD-10-CM

## 2023-07-05 DIAGNOSIS — I10 PRIMARY HYPERTENSION: Chronic | ICD-10-CM

## 2023-07-05 DIAGNOSIS — R41.3 OTHER AMNESIA: Primary | ICD-10-CM

## 2023-07-05 PROCEDURE — 99999 PR PBB SHADOW E&M-EST. PATIENT-LVL III: CPT | Mod: PBBFAC,,,

## 2023-07-05 PROCEDURE — 99213 OFFICE O/P EST LOW 20 MIN: CPT | Mod: PBBFAC,25

## 2023-07-05 PROCEDURE — 96139 PSYCL/NRPSYC TST TECH EA: CPT | Mod: ,,, | Performed by: PSYCHIATRY & NEUROLOGY

## 2023-07-05 PROCEDURE — 96133 NRPSYC TST EVAL PHYS/QHP EA: CPT | Mod: ,,, | Performed by: PSYCHIATRY & NEUROLOGY

## 2023-07-05 PROCEDURE — 96138 PSYCL/NRPSYC TECH 1ST: CPT | Mod: ,,, | Performed by: PSYCHIATRY & NEUROLOGY

## 2023-07-05 PROCEDURE — 99499 UNLISTED E&M SERVICE: CPT | Mod: S$PBB,,, | Performed by: PSYCHIATRY & NEUROLOGY

## 2023-07-05 PROCEDURE — 99999 PR PBB SHADOW E&M-EST. PATIENT-LVL III: ICD-10-PCS | Mod: PBBFAC,,,

## 2023-07-05 PROCEDURE — 96116 PR NEUROBEHAVIORAL STATUS EXAM BY PSYCH/PHYS: ICD-10-PCS | Mod: S$PBB,,, | Performed by: PSYCHIATRY & NEUROLOGY

## 2023-07-05 PROCEDURE — 96116 NUBHVL XM PHYS/QHP 1ST HR: CPT | Mod: S$PBB,,, | Performed by: PSYCHIATRY & NEUROLOGY

## 2023-07-05 PROCEDURE — 96132 PR NEUROPSYCHOLOGIC TEST EVAL SVCS, 1ST HR: ICD-10-PCS | Mod: ,,, | Performed by: PSYCHIATRY & NEUROLOGY

## 2023-07-05 PROCEDURE — 96132 NRPSYC TST EVAL PHYS/QHP 1ST: CPT | Mod: ,,, | Performed by: PSYCHIATRY & NEUROLOGY

## 2023-07-05 PROCEDURE — 96133 PR NEUROPSYCHOLOGIC TEST EVAL SVCS, EA ADDTL HR: ICD-10-PCS | Mod: ,,, | Performed by: PSYCHIATRY & NEUROLOGY

## 2023-07-05 PROCEDURE — 96116 NUBHVL XM PHYS/QHP 1ST HR: CPT | Mod: PBBFAC,59 | Performed by: PSYCHIATRY & NEUROLOGY

## 2023-07-05 PROCEDURE — 96138 PR PSYCH/NEUROPSYCH TEST ADMIN/SCORING, BY TECH, 2+ TESTS, 1ST 30 MIN: ICD-10-PCS | Mod: ,,, | Performed by: PSYCHIATRY & NEUROLOGY

## 2023-07-05 PROCEDURE — 99215 OFFICE O/P EST HI 40 MIN: CPT | Mod: S$PBB,,, | Performed by: NURSE PRACTITIONER

## 2023-07-05 PROCEDURE — 96139 PR PSYCH/NEUROPSYCH TEST ADMIN/SCORING, BY TECH, 2+ TESTS, EA ADDTL 30 MIN: ICD-10-PCS | Mod: ,,, | Performed by: PSYCHIATRY & NEUROLOGY

## 2023-07-05 PROCEDURE — 99499 NO LOS: ICD-10-PCS | Mod: S$PBB,,, | Performed by: PSYCHIATRY & NEUROLOGY

## 2023-07-05 PROCEDURE — 99215 PR OFFICE/OUTPT VISIT, EST, LEVL V, 40-54 MIN: ICD-10-PCS | Mod: S$PBB,,, | Performed by: NURSE PRACTITIONER

## 2023-07-05 NOTE — PROGRESS NOTES
"Name: Alyce Lewis  MRN: [id number]   CSN: [id number]      Date: 7-5-23      Referring physician:  Suri Jim, NP  [address] [place] [address]    Subjective:      Chief Complaint: memory     History of Present Illness (HPI):    Alyce Lewis is a 77 y.o. left-handed female who presents today for an initial evaluation of memory loss and is accompanied by daughter, Monae.       They were interviewed together and equally contributed to history:  Monae has noted memory trouble for the past year. She does feel it is gradually worsening.   Ms. Lewis is really not worried about her memory. She says she relies on Monae.  "Always know [facility] ice cream is [facility] [employer] [employer] (Repeats this on two more occasions during visit).     No walking issues.  No falls.     Lives alone for over 30 years.   They are not aware of talking in sleeep or acting out dreams.       Oxybutynin for years. It does help.     Ms. Lewis answered the following ROS in presence of daughter:  Review of Systems   Constitutional:  Negative for appetite change and unexpected weight change.   HENT:  Positive for dental problem (tooth problem recently). Negative for drooling, hearing loss and trouble swallowing.    Eyes:  Positive for visual disturbance (reading glasses; prev B catarat removal).   Respiratory:  Negative for cough and choking.    Cardiovascular:  Negative for chest pain, palpitations and leg swelling.   Gastrointestinal:  Negative for abdominal pain, constipation, diarrhea and nausea.   Genitourinary:  Negative for difficulty urinating and urgency.   Musculoskeletal:  Negative for gait problem.   Neurological:  Positive for headaches (Migraine (left side) very occasionally). Negative for dizziness, tremors, seizures and light-headedness.   Psychiatric/Behavioral:  Positive for dysphoric mood (intermittent). Negative for hallucinations and sleep disturbance. The patient is nervous/anxious.      Past Medical History: The patient  " has a past medical history of BRCA1 positive, Breast cancer, Hyperlipidemia, Melanoma, Migraine, and Usual hyperplasia of lactiferous duct.    Social History: The patient  reports that she has never smoked. She has never used smokeless tobacco. She reports that she does not drink alcohol and does not use drugs.    Family History: Their family history includes Breast cancer in her mother; Heart attack in her father; Hypertension in her maternal grandmother, sister, and sister; Melanoma in her sister; Pancreatic cancer in her mother; Prostate cancer in her brother.    Allergies: Penicillins, Demerol [meperidine], and Sulfa (sulfonamide antibiotics)     Meds:   Current Outpatient Medications on File Prior to Visit   Medication Sig Dispense Refill    busPIRone (BUSPAR) 15 MG tablet Take 1 tablet (15 mg total) by mouth 2 (two) times daily. 60 tablet 11    cyclobenzaprine (FLEXERIL) 10 MG tablet TAKE 1 TABLET BY MOUTH 2 TIMES DAILY AS NEEDED FOR MUSCLE SPASMS. 30 tablet 6    fluticasone propionate (FLONASE) 50 mcg/actuation nasal spray 2 SPRAYS (100 MCG TOTAL) BY EACH NOSTRIL ROUTE ONCE DAILY AT 6AM. 16 mL 11    ibuprofen (ADVIL,MOTRIN) 200 MG tablet Take 200 mg by mouth every 6 (six) hours as needed for Pain.      metoprolol succinate (TOPROL-XL) 25 MG 24 hr tablet Take 1 tablet (25 mg total) by mouth once daily. 30 tablet 11    oxybutynin (DITROPAN-XL) 10 MG 24 hr tablet Take 1 tablet (10 mg total) by mouth once daily. 30 tablet 11    rizatriptan (MAXALT-MLT) 10 MG disintegrating tablet May repeat in 2 hours if needed 9 tablet 11    sertraline (ZOLOFT) 100 MG tablet Take 1 tablet (100 mg total) by mouth once daily. 30 tablet 11    simvastatin (ZOCOR) 40 MG tablet Take 1 tablet (40 mg total) by mouth every evening. 30 tablet 11     No current facility-administered medications on file prior to visit.       Objective:     Physical Exam:    Vitals:    07/05/23 1009   BP: (!) 154/78   Pulse: 94   Weight: 69.9 kg (154 lb)  "  Height: 5' 2" (1.575 m)     Body mass index is 28.17 kg/m².    Constitutional  Well-developed, well-nourished, appears stated age   Cardiovascular  no LE edema bilaterally     ..  Neurological    * Mental status     - Orientation Oriented to: person, place, and situation     - Memory     Provides reasonable hx, relies on dtr to provide as well  Repeats self on occasion      - Attention/concentration  Attends to interview without distraction     - Language  Spontaneous, fluent     - Fund of knowledge  Aware of current events     - Executive  Well-organized thoughts         ..  * Cranial nerves       - CN II  PERRL, visual fields full to confrontation     - CN III, IV, VI  Extraocular movements full, normal pursuits and saccades     - CN V  Sensation V1 - V3 intact     - CN VII  Face strong and symmetric bilaterally     - CN VIII  Hearing intact bilaterally     - CN IX, X  Palate raises midline and symmetric     - CN XI  SCM and trapezius 5/5 bilaterally     - CN XII  Tongue midline   * Motor  Muscle bulk normal, strength 5/5 throughout   * Sensory   Intact to temperature and vibration throughout   * Coordination  No dysmetria with finger-to-nose    * Gait  See below.   * Deep tendon reflexes  2+ and symmetric throughout     Arthritis noted in fingers   ..  * Specialized movement exam  No hypophonic speech.    No facial masking.   No cogwheel rigidity.     No bradykinesia.   No tremor with rest, posture, kinesis  Slight intention tremor RH and subtle LH.   No other dystonia, chorea, athetosis, myoclonus, or tics.   No motor impersistence.   Normal-based gait.   No shortened stride length.   No abnormal arm swing.             Laboratory Results:  No visits with results within 3 Month(s) from this visit.   Latest known visit with results is:   Office Visit on 01/31/2023   Component Date Value Ref Range Status    Cytology ThinPrep Pap Source 01/31/2023 Cervix   Final    Cytology ThinPrep Pap Report Status 01/31/2023 " DNR   Final    Cytology Thinprep PAP Clinical His* 01/31/2023 Routine exam   Corrected    Cytology ThinPrep Pap LMP 01/31/2023 unknown   Final    Cytology ThinPrep Previous PAP 01/31/2023 Unknown   Final    Cytology ThinPrep Previous Biopsy 01/31/2023 No   Final    Cytology ThinPrep PAP Adequacy 01/31/2023 SATISFACTORY FOR EVALUATION   Final    Cytology ThinPrep PAP General Sharda* 01/31/2023 DNR   Final    Cytology ThinPrep PAP Interpretati* 01/31/2023 SEE BELOW   Final    Cytology ThinPrep PAP Comment 01/31/2023 SEE BELOW   Final    Cytotechnologist 01/31/2023 SEE BELOW   Final    Review Cytotechnologist 01/31/2023 DNR   Final    Pathologist 01/31/2023 DNR   Final    Cytology ThinPrep PAP Infection 01/31/2023 DNR   Final    Cytology Thin Prep Pap Explanation 01/31/2023 SEE BELOW   Final           Imaging:   No results found for this or any previous visit.        Assessment and Plan     Other amnesia  -     Vitamin B1; Future; Expected date: 07/05/2023  -     Vitamin B12; Future; Expected date: 07/05/2023  -     FOLATE; Future; Expected date: 07/05/2023  -     RPR; Future; Expected date: 07/05/2023  -     MRI Brain Without Contrast; Future; Expected date: 07/05/2023    Mild cognitive impairment    Prediabetes    Primary hypertension    Mixed hyperlipidemia        Medical Decision Making:    Oxybutynin could cause cog SE. May be  worth tt prescriber to see if there is another option.    Discussed completing reversible labs and brain scan. Discussed rationale. Agreeable.    Discussed vascular RF and importance of RF control.     Follow up in August to review results of above and today's testing.      ..Total time: 58 minutes spent on the encounter, which includes face to face time and non-face to face time preparing to see the patient (eg, review of tests), Obtaining and/or reviewing separately obtained history, Documenting clinical information in the electronic or other health record, Independently interpreting  results (not separately reported) and communicating results to the patient/family/caregiver, or Care coordination (not separately reported).       Suri Jim DNP, NP-C  Division of Movement and Memory Disorders  [facility]  [phone]

## 2023-07-11 PROBLEM — G31.84 MILD COGNITIVE IMPAIRMENT: Status: ACTIVE | Noted: 2023-07-11

## 2023-07-11 NOTE — ASSESSMENT & PLAN NOTE
Level of Care: Family may want to provide more medication and financial oversight to ensure 100% adherence.     Future Care Planning: Establish POA and discuss future care preferences as she may require a higher level of support in the future.     Medication Considerations: Oxybutynin and Flexeril are anticholingeric medications that can impact memory.     Neuropsychiatric Symptoms: Irritability and anxiety may be points of intervention.     Follow-up: RTC on 8/2 to review all test results.

## 2023-07-14 ENCOUNTER — LAB VISIT (OUTPATIENT)
Dept: LAB | Facility: HOSPITAL | Age: 78
End: 2023-07-14
Payer: MEDICARE

## 2023-07-14 DIAGNOSIS — R41.3 OTHER AMNESIA: ICD-10-CM

## 2023-07-14 LAB
FOLATE SERPL-MCNC: 8.4 NG/ML (ref 4–24)
VIT B12 SERPL-MCNC: 783 PG/ML (ref 210–950)

## 2023-07-14 PROCEDURE — 86592 SYPHILIS TEST NON-TREP QUAL: CPT | Performed by: NURSE PRACTITIONER

## 2023-07-14 PROCEDURE — 84425 ASSAY OF VITAMIN B-1: CPT | Performed by: NURSE PRACTITIONER

## 2023-07-14 PROCEDURE — 82607 VITAMIN B-12: CPT | Performed by: NURSE PRACTITIONER

## 2023-07-14 PROCEDURE — 82746 ASSAY OF FOLIC ACID SERUM: CPT | Performed by: NURSE PRACTITIONER

## 2023-07-14 PROCEDURE — 36415 COLL VENOUS BLD VENIPUNCTURE: CPT | Mod: PO | Performed by: NURSE PRACTITIONER

## 2023-07-15 LAB — RPR SER QL: NORMAL

## 2023-07-18 LAB — VIT B1 BLD-MCNC: 56 UG/L (ref 38–122)

## 2023-07-26 ENCOUNTER — HOSPITAL ENCOUNTER (OUTPATIENT)
Dept: RADIOLOGY | Facility: HOSPITAL | Age: 78
Discharge: HOME | End: 2023-07-26
Attending: NURSE PRACTITIONER
Payer: MEDICARE

## 2023-07-26 DIAGNOSIS — R41.3 OTHER AMNESIA: ICD-10-CM

## 2023-07-26 PROCEDURE — 70551 MRI BRAIN STEM W/O DYE: CPT | Mod: TC

## 2023-07-26 PROCEDURE — 70551 MRI BRAIN WITHOUT CONTRAST: ICD-10-PCS | Mod: 26,,, | Performed by: RADIOLOGY

## 2023-07-26 PROCEDURE — 70551 MRI BRAIN STEM W/O DYE: CPT | Mod: 26,,, | Performed by: RADIOLOGY

## 2023-08-02 ENCOUNTER — OFFICE VISIT (OUTPATIENT)
Dept: NEUROLOGY | Facility: CLINIC | Age: 78
End: 2023-08-02
Payer: MEDICARE

## 2023-08-02 VITALS
WEIGHT: 153.56 LBS | DIASTOLIC BLOOD PRESSURE: 83 MMHG | HEART RATE: 86 BPM | SYSTOLIC BLOOD PRESSURE: 128 MMHG | BODY MASS INDEX: 28.26 KG/M2 | HEIGHT: 62 IN

## 2023-08-02 DIAGNOSIS — I10 PRIMARY HYPERTENSION: Chronic | ICD-10-CM

## 2023-08-02 DIAGNOSIS — R73.03 PREDIABETES: ICD-10-CM

## 2023-08-02 DIAGNOSIS — N32.81 OVERACTIVE BLADDER: ICD-10-CM

## 2023-08-02 DIAGNOSIS — G31.84 MILD COGNITIVE IMPAIRMENT: Primary | ICD-10-CM

## 2023-08-02 DIAGNOSIS — E78.2 MIXED HYPERLIPIDEMIA: Chronic | ICD-10-CM

## 2023-08-02 PROCEDURE — 99999 PR PBB SHADOW E&M-EST. PATIENT-LVL III: ICD-10-PCS | Mod: PBBFAC,,,

## 2023-08-02 PROCEDURE — 99213 OFFICE O/P EST LOW 20 MIN: CPT | Mod: PBBFAC

## 2023-08-02 PROCEDURE — 99215 PR OFFICE/OUTPT VISIT, EST, LEVL V, 40-54 MIN: ICD-10-PCS | Mod: S$PBB,,, | Performed by: NURSE PRACTITIONER

## 2023-08-02 PROCEDURE — 99215 OFFICE O/P EST HI 40 MIN: CPT | Mod: S$PBB,,, | Performed by: NURSE PRACTITIONER

## 2023-08-02 PROCEDURE — 99499 UNLISTED E&M SERVICE: CPT | Mod: S$PBB,,, | Performed by: PSYCHIATRY & NEUROLOGY

## 2023-08-02 PROCEDURE — 99999 PR PBB SHADOW E&M-EST. PATIENT-LVL III: CPT | Mod: PBBFAC,,,

## 2023-08-02 PROCEDURE — 99499 NO LOS: ICD-10-PCS | Mod: S$PBB,,, | Performed by: PSYCHIATRY & NEUROLOGY

## 2023-08-02 NOTE — PROGRESS NOTES
Name: Alyce Lewis  MRN: [id number]   CSN: [id number]      Date: 8-2-23      Referring physician:  No referring provider defined for this encounter.    Subjective:      Chief Complaint: Follow up memory     History of Present Illness (HPI):    Alyce Lewis is a 77 y.o. left-handed female with pre-diabetes, HTN, HLD who presents today for a follow-up evaluation of MCI and is accompanied by daughter, Monae. Initially seen in office 7-5-23. Completion of reversible labs and MRI brain ordered. Discussed that oxybutynin can cause cognitive SE.             Review of Systems   Constitutional:  Negative for appetite change.   Genitourinary:         Bladder issues- oxybutynin very helpful   Musculoskeletal:  Negative for back pain.       Past Medical History: The patient  has a past medical history of BRCA1 positive, Breast cancer, Hyperlipidemia, Melanoma, Migraine, and Usual hyperplasia of lactiferous duct.    Social History: The patient  reports that she has never smoked. She has never used smokeless tobacco. She reports that she does not drink alcohol and does not use drugs.    Family History: Their family history includes Breast cancer in her mother; Heart attack in her father; Hypertension in her maternal grandmother, sister, and sister; Melanoma in her sister; Pancreatic cancer in her mother; Prostate cancer in her brother.    Allergies: Penicillins, Demerol [meperidine], and Sulfa (sulfonamide antibiotics)     Meds:   Current Outpatient Medications on File Prior to Visit   Medication Sig Dispense Refill    busPIRone (BUSPAR) 15 MG tablet Take 1 tablet (15 mg total) by mouth 2 (two) times daily. 60 tablet 11    fluticasone propionate (FLONASE) 50 mcg/actuation nasal spray 2 SPRAYS (100 MCG TOTAL) BY EACH NOSTRIL ROUTE ONCE DAILY AT 6AM. 16 mL 11    ibuprofen (ADVIL,MOTRIN) 200 MG tablet Take 200 mg by mouth every 6 (six) hours as needed for Pain.      metoprolol succinate (TOPROL-XL) 25 MG 24 hr tablet Take 1 tablet  "(25 mg total) by mouth once daily. 30 tablet 11    oxybutynin (DITROPAN-XL) 10 MG 24 hr tablet Take 1 tablet (10 mg total) by mouth once daily. 30 tablet 11    rizatriptan (MAXALT-MLT) 10 MG disintegrating tablet May repeat in 2 hours if needed 9 tablet 11    sertraline (ZOLOFT) 100 MG tablet Take 1 tablet (100 mg total) by mouth once daily. 30 tablet 11    simvastatin (ZOCOR) 40 MG tablet Take 1 tablet (40 mg total) by mouth every evening. 30 tablet 11    cyclobenzaprine (FLEXERIL) 10 MG tablet TAKE 1 TABLET BY MOUTH 2 TIMES DAILY AS NEEDED FOR MUSCLE SPASMS. 30 tablet 6     No current facility-administered medications on file prior to visit.       Objective:     Physical Exam:    Vitals:    08/02/23 1513   BP: 128/83   Pulse: 86   Weight: 69.7 kg (153 lb 8.8 oz)   Height: 5' 2" (1.575 m)     Body mass index is 28.08 kg/m².    Constitutional  Well-developed, well-nourished, appears stated age   Cardiovascular  no LE edema bilaterally     Awake, alert, pleasant and cooperative.   RR equal and unlabored. NAD.   Engaged. Asks appropriate questions.   Face symmetric.   Language spontaneous, fluent.   EOMI.  Hearing intact to conversation.   No tremor observed today.   Gait normal and steady.      Laboratory Results:  Lab Visit on 07/14/2023   Component Date Value Ref Range Status    Thiamine 07/14/2023 56  38 - 122 ug/L Final    Vitamin B-12 07/14/2023 783  210 - 950 pg/mL Final    Folate 07/14/2023 8.4  4.0 - 24.0 ng/mL Final    RPR 07/14/2023 Non-reactive  Non-reactive Final           Imaging:                     Results for orders placed or performed during the hospital encounter of 07/26/23   MRI Brain Without Contrast    Narrative    EXAMINATION:  MRI BRAIN WITHOUT CONTRAST    CLINICAL HISTORY:  Memory loss;.  Other amnesia    TECHNIQUE:  Multiplanar multisequence MR imaging of the brain was performed without contrast.    COMPARISON:  No relevant priors.    FINDINGS:  Intracranial compartment:    Mild ventricular " widening consistent with stable mild central volume loss.  No evidence of hydrocephalus.  There may be an element of hippocampal volume loss particularly on the left.    No extra-axial blood or fluid collections.  No mass effect.    No intracranial hemorrhage or acute infarct.    Scattered focal T2 FLAIR hyperintensities within the periventricular and subcortical white matter nonspecific but may reflect mild with chronic microvascular ischemia.  No evidence of prior territorial or cortical infarct..    Normal vascular flow voids are preserved at the skull base.    Skull/extracranial contents (limited evaluation): Bone marrow signal intensity is normal.  The visualized sinuses and mastoid air cells are essentially clear.      Impression    No acute intracranial process    Mild volume loss.  No advanced ischemic changes identified.  No structural abnormality.    Electronically signed by resident: Andres Ray  Date:    07/26/2023  Time:    09:00    Electronically signed by: Andres Young  Date:    07/26/2023  Time:    10:30         Assessment and Plan     Mild cognitive impairment    Prediabetes    Primary hypertension    Mixed hyperlipidemia    Overactive bladder        Medical Decision Making:    She is taking oxybutynin daily, as well as cyclobenzaprine daily. She has been taking cyclobenzaprine for years. Dtr recalls that she was given this for back pain but did not realize she was still taking. They are not sure she still needs it.   Discussed how both of these medications can cause/contribute to cognitive difficulties. Recommend seeing if oxybutynin can be switched. Also recommend tt PCP about weaning off cyclobenzaprine.    Reviewed labs. No reversible causes found.    Reviewed MRI report and brain images.   Discussed vascular RF and importance of RF control.     Discussed MCI vs dementia.     Dr. Pina will also meet with them to review results and recommendations from Neuropsychological evaluation.         FOLLOW UP 6 months.             ..Total time: 55 minutes spent on the encounter, which includes face to face time and non-face to face time preparing to see the patient (eg, review of tests), Obtaining and/or reviewing separately obtained history, Documenting clinical information in the electronic or other health record, Independently interpreting results (not separately reported) and communicating results to the patient/family/caregiver, or Care coordination (not separately reported).         Suri Jim, FELICIANO, NP-C  Division of Movement and Memory Disorders  [facility]  [phone]

## 2023-08-03 NOTE — PROGRESS NOTES
NEUROPSYCHOLOGICAL EVALUATION - CONFIDENTIAL  FEEDBACK NOTE    On 8/3/2023, I provided Ms. Alyce Lewis and her daughter the neuropsychological evaluation results. Please see the full report for a comprehensive overview of the findings. Ms. Lewis was provided a copy of the report and invited to call with additional questions.      Raphael Pina Psy.D., LEONELP  Board Certified in Clinical Neuropsychology  [facility] - Department of Neurology

## 2023-12-20 ENCOUNTER — OFFICE VISIT (OUTPATIENT)
Dept: PRIMARY CARE CLINIC | Facility: CLINIC | Age: 78
End: 2023-12-20
Payer: MEDICARE

## 2023-12-20 VITALS
HEART RATE: 76 BPM | BODY MASS INDEX: 27.95 KG/M2 | SYSTOLIC BLOOD PRESSURE: 112 MMHG | OXYGEN SATURATION: 97 % | DIASTOLIC BLOOD PRESSURE: 80 MMHG | WEIGHT: 151.88 LBS | HEIGHT: 62 IN

## 2023-12-20 DIAGNOSIS — N32.81 OVERACTIVE BLADDER: ICD-10-CM

## 2023-12-20 DIAGNOSIS — C43.61 MALIGNANT MELANOMA OF RIGHT UPPER EXTREMITY INCLUDING SHOULDER: ICD-10-CM

## 2023-12-20 DIAGNOSIS — C50.511 MALIGNANT NEOPLASM OF LOWER-OUTER QUADRANT OF RIGHT BREAST OF FEMALE, ESTROGEN RECEPTOR NEGATIVE: ICD-10-CM

## 2023-12-20 DIAGNOSIS — G31.84 MILD COGNITIVE IMPAIRMENT: Primary | ICD-10-CM

## 2023-12-20 DIAGNOSIS — Z17.1 MALIGNANT NEOPLASM OF LOWER-OUTER QUADRANT OF RIGHT BREAST OF FEMALE, ESTROGEN RECEPTOR NEGATIVE: ICD-10-CM

## 2023-12-20 DIAGNOSIS — G43.909 MIGRAINE SYNDROME: ICD-10-CM

## 2023-12-20 DIAGNOSIS — Z00.00 PREVENTATIVE HEALTH CARE: ICD-10-CM

## 2023-12-20 DIAGNOSIS — R73.03 PREDIABETES: ICD-10-CM

## 2023-12-20 DIAGNOSIS — Z85.820 PERSONAL HISTORY OF MALIGNANT MELANOMA OF SKIN: ICD-10-CM

## 2023-12-20 DIAGNOSIS — F41.1 GAD (GENERALIZED ANXIETY DISORDER): ICD-10-CM

## 2023-12-20 DIAGNOSIS — E78.2 MIXED HYPERLIPIDEMIA: ICD-10-CM

## 2023-12-20 PROBLEM — F41.9 ANXIETY: Chronic | Status: RESOLVED | Noted: 2018-06-14 | Resolved: 2023-12-20

## 2023-12-20 PROBLEM — Z98.49 HX OF CATARACT SURGERY: Status: RESOLVED | Noted: 2021-06-23 | Resolved: 2023-12-20

## 2023-12-20 PROCEDURE — 99999 PR PBB SHADOW E&M-EST. PATIENT-LVL V: CPT | Mod: PBBFAC,,, | Performed by: INTERNAL MEDICINE

## 2023-12-20 PROCEDURE — 99215 OFFICE O/P EST HI 40 MIN: CPT | Mod: S$PBB,,, | Performed by: INTERNAL MEDICINE

## 2023-12-20 PROCEDURE — 90677 PCV20 VACCINE IM: CPT | Mod: PBBFAC,PN

## 2023-12-20 PROCEDURE — 99215 OFFICE O/P EST HI 40 MIN: CPT | Mod: PBBFAC,PN,25 | Performed by: INTERNAL MEDICINE

## 2023-12-20 PROCEDURE — 99999PBSHW PNEUMOCOCCAL CONJUGATE VACCINE 20-VALENT: Mod: PBBFAC,,,

## 2023-12-20 RX ORDER — ATORVASTATIN CALCIUM 40 MG/1
40 TABLET, FILM COATED ORAL DAILY
Qty: 90 TABLET | Refills: 3 | Status: SHIPPED | OUTPATIENT
Start: 2023-12-20 | End: 2024-12-19

## 2023-12-20 RX ORDER — MIRABEGRON 25 MG/1
25 TABLET, FILM COATED, EXTENDED RELEASE ORAL DAILY
Qty: 30 TABLET | Refills: 11 | Status: SHIPPED | OUTPATIENT
Start: 2023-12-20 | End: 2024-12-19

## 2023-12-20 NOTE — PATIENT INSTRUCTIONS
take Motrin 400mg with food then Maxalt prn severe headache  Lie in dark, quiet room     Cont Zoloft 100mg daily  Take buspar as needed for breakthrough anxiety     -Check Bps at home weekly and prn symptoms for goal BP <130/80 and >110/60; HR >55 and <100 bpm   -Start healthy diet high in fiber (25-35 gm daily), low fat dairy, lean protein, low in saturated/trans fat (minimize cheese, creamy salad dressings); high in calcium/magnesium/potassium; 1.5 gm sodium diet; low in processed sugars and foods, ie  WHITE sugars, bread, pasta, rice, ice cream, cookies, cake, doughnuts  -Drink 6-8 glasses of water daily  -Moderate exercise 30 min 5 times per week or 75 min intense exercise biweekly   -Start 8-10 hour intermittent fasting diet   -Avoid eating 3 hours prior to bedtime  -Reduce alcohol to 1 glass per day  --Minimize NSAIDs    Change to lipitor 40mg daily and stop zocor 40mg daily   Check lipids in 2 months    Hold oxybutynin; start Myrbetriq if needed for incontinence     Labs in 2 months (fasting avoid eating x 8 hours)     Prevnar 20 today    Flu, COVID, RSV     Refer to Derm

## 2023-12-20 NOTE — ASSESSMENT & PLAN NOTE
Cont Toprol XL 25mg daily  -Check Bps at home weekly and prn symptoms for goal BP <130/80 and avoid <110/60; HR >55 bpm and <100bpm   -Start healthy diet high in fiber (25-35 gm daily), low fat dairy, lean protein, low in saturated/trans fat (minimize cheese, creamy salad dressings); high in calcium/magnesium/potassium; 1.5 gm sodium diet; low in processed sugars and foods, ie  WHITE sugars, bread, pasta, rice, ice cream, cookies, cake, doughnuts  -Drink 6-8 glasses of water daily  -Moderate exercise 30 min 5 times per week or 75 min intense exercise biweekly   -Start 8-10 hour intermittent fasting diet   -Avoid eating 3 hours prior to bedtime  -Reduce alcohol to 1-2 glasses per day  --Minimize NSAIDs

## 2023-12-20 NOTE — ASSESSMENT & PLAN NOTE
Prevnar 20 today   Get flu shot   Refer to Derm for screening   Labs in 2 months (fasting avoid eating x 8 hours) to check FLP on lipitor 40mg daily, HbA1C, and routine labs

## 2023-12-20 NOTE — ASSESSMENT & PLAN NOTE
-Check Bps at home weekly and prn symptoms for goal BP <130/80.  -Start healthy diet high in fiber (25-35 gm daily), low fat dairy, lean protein, low in saturated/trans fat (minimize cheese, creamy salad dressings); high in calcium/magnesium/potassium; 1.5 gm sodium diet; low in processed sugars and foods, ie  WHITE sugars, bread, pasta, rice, ice cream, cookies, cake, doughnuts  -Drink 6-8 glasses of water daily  -Moderate exercise 30 min 5 times per week or 75 min intense exercise biweekly   -Start 8-10 hour intermittent fasting diet   -Avoid eating 3 hours prior to bedtime  -

## 2023-12-20 NOTE — ASSESSMENT & PLAN NOTE
Change to lipitor 40mg daily since 10-year CV risk 20.3% and stop zocor 40mg daily   Check lipids in 2 months

## 2023-12-20 NOTE — PROGRESS NOTES
[facility] Progress Note      Chief Complaint     Annual Exam and Establish Care       Subjective:      History of Present Illness:  Alyce Lewis is a 78 y.o. female former pt of Dr. Boles here to establish care    Short-term Memory Loss/Mild cognitive impairment: saw Neuropsych Dr. Pina and had normal MRI head with mild volume loss 2023; and labs   -sensorium clearer when she stopped flexeril x 3 mo     Questionable hx of incontinence: asymptomatic on ditropan but concer thatn ditropan may be causing MCI  -Neuropsych recommended to switch off of Ditropan to Myrbetriq but not sure if she really has incontinence    NOHEMY x 20 years: stable on Zoloft 100mg daily; takes buspar 15mg daily but denies feeling anxious since retired from high-stress teaching job in 2019     Migraine H/A: takes Motrin 400mg with food then  Maxalt prn monthly;     HLD: compliant with zocor 40mg daily without side effects    HTN: well-controlled on Toprol XL 25mg daily; denies CP, SOB, FELIX    AR: flonase prn     PreDM: HbA1C 6.0 in 2023; pt did not realize she had issues with hyperglycemia    Hx of right breast cancer: lumpectomy  with neg MMG 2023    Melanoma prox forearm removed : needs derm screening    SH: walks on treadmill 30 min qod; no tobacco, drugs, alcohol;  but estranged; has 1 son and 1 daughter; drives and lives independently does ADLs  FH: melanoma: sister and nephew; mom pancreatic cancer dx'ed at 89 and  at 93 y/o; dad  in 60s CAD;  7 siblings healthy - all sisters with NOHEMY - alive     Past Medical History:  Past Medical History:   Diagnosis Date    BRCA1 positive     Breast cancer     ; lumpectomy and radiation; dr. condon    Hx of cataract surgery 2021    Hyperlipidemia     Melanoma     on arm; right arm; dr. jennie nolasco    Migraine     uses maxalt disintegrating;     Usual hyperplasia of lactiferous duct        Past Surgical History:  Past Surgical  History:   Procedure Laterality Date    BREAST BIOPSY      BREAST LUMPECTOMY      carpel tunnel release      HAND SURGERY      melanoma removal      right arm    TONSILLECTOMY         Allergies:  Review of patient's allergies indicates:   Allergen Reactions    Penicillins Hives    Demerol [meperidine] Nausea And Vomiting    Sulfa (sulfonamide antibiotics) Hives       Home Medications:  Current Outpatient Medications   Medication Sig Dispense Refill    busPIRone (BUSPAR) 15 MG tablet Take 1 tablet (15 mg total) by mouth 2 (two) times daily. 60 tablet 11    fluticasone propionate (FLONASE) 50 mcg/actuation nasal spray 2 SPRAYS (100 MCG TOTAL) BY EACH NOSTRIL ROUTE ONCE DAILY AT 6AM. 16 mL 11    ibuprofen (ADVIL,MOTRIN) 200 MG tablet Take 200 mg by mouth every 6 (six) hours as needed for Pain.      metoprolol succinate (TOPROL-XL) 25 MG 24 hr tablet Take 1 tablet (25 mg total) by mouth once daily. 30 tablet 11    oxybutynin (DITROPAN-XL) 10 MG 24 hr tablet Take 1 tablet (10 mg total) by mouth once daily. 30 tablet 11    rizatriptan (MAXALT-MLT) 10 MG disintegrating tablet May repeat in 2 hours if needed 9 tablet 11    sertraline (ZOLOFT) 100 MG tablet Take 1 tablet (100 mg total) by mouth once daily. 30 tablet 11    atorvastatin (LIPITOR) 40 MG tablet Take 1 tablet (40 mg total) by mouth once daily. 90 tablet 3    mirabegron (MYRBETRIQ) 25 mg Tb24 ER tablet Take 1 tablet (25 mg total) by mouth once daily. 30 tablet 11    simvastatin (ZOCOR) 40 MG tablet Take 1 tablet (40 mg total) by mouth every evening. 30 tablet 11     No current facility-administered medications for this visit.        Family History:  Family History   Problem Relation Age of Onset    Heart attack Father     Pancreatic cancer Mother     Breast cancer Mother     Hypertension Maternal Grandmother     Prostate cancer Brother     Melanoma Sister     Hypertension Sister     Hypertension Sister        Social History:  Social History     Tobacco Use     "Smoking status: Never    Smokeless tobacco: Never   Substance Use Topics    Alcohol use: No    Drug use: No       Review of Systems:  Pertinent positives and negatives listed in HPI. All other systems are reviewed and are negative.    Health Maintaince :   Health Maintenance Topics with due status: Not Due       Topic Last Completion Date    Colonoscopy 11/27/2018    TETANUS VACCINE 06/23/2021    Hemoglobin A1c (Prediabetes) 01/11/2023    Lipid Panel 01/11/2023           Eye: UTD cataracts OU 2010  Dental: UTD    Immunizations:   Tdap: n/a.  Influenza: needs.  Pneumovax: needs  Shingrex x 2: UTD  COVID: needs  Hepatitis C:   Cancer Screening:  PAP: UTD normal GYN exam with Dr. Wiedemann 1/31/2023  Mammogram: UTD 1/23/2023  Colonoscopy: good until 11/2028   DEXA:  normal 6/2021   LDCT:     The 10-year ASCVD risk score (Sarah ISLAS, et al., 2019) is: 20.3%    Values used to calculate the score:      Age: 78 years      Sex: Female      Is Non- : No      Diabetic: No      Tobacco smoker: No      Systolic Blood Pressure: 112 mmHg      Is BP treated: Yes      HDL Cholesterol: 57 mg/dL      Total Cholesterol: 184 mg/dL      Objective:   /80 (BP Location: Left arm, Patient Position: Sitting, BP Method: Medium (Manual))   Pulse 76   Ht 5' 2" (1.575 m)   Wt 68.9 kg (151 lb 14.4 oz)   SpO2 97%   BMI 27.78 kg/m²      Body mass index is 27.78 kg/m².       Physical Examination:  General: Alert and awake in no apparent distress  HEENT: Normocephalic and atraumatic; Tms WNL  Eyes:  PERRL; EOMi with anicteric sclera and clear conjunctivae  Mouth:  Oropharynx clear and without exudate; moist mucous membranes  Neck:   Cervical nodes not enlarged; supple; no bruits  Cardio:  Regular rate and rhythm with normal S1 and S2; no murmurs or rubs  Resp:  CTAB; respirations unlabored; no wheezes, crackles or rhonchi  Abdom: Soft, NTND with normoactive bowel sounds; negative HSM  Extrem: Warm and well-perfused " with no clubbing, cyanosis or edema  Skin:  No rashes, lesions, or color changes  Pulses:  2+ and symmetric distally  Neuro:  AAOx3; cooperative and pleasant with no focal deficits    Laboratory:      Most Recent Data:  Lab Results   Component Value Date    WBC 5.02 01/11/2023    HGB 12.6 01/11/2023    HCT 40.1 01/11/2023     01/11/2023    CHOL 184 01/11/2023    TRIG 73 01/11/2023    HDL 57 01/11/2023    ALT 13 01/11/2023    AST 16 01/11/2023     01/11/2023    K 4.4 01/11/2023     01/11/2023    BUN 13 01/11/2023    CO2 26 01/11/2023    TSH 1.307 01/11/2023    HGBA1C 6.0 (H) 01/11/2023              CBC:   WBC   Date Value Ref Range Status   01/11/2023 5.02 3.90 - 12.70 K/uL Final     Hemoglobin   Date Value Ref Range Status   01/11/2023 12.6 12.0 - 16.0 g/dL Final     Hematocrit   Date Value Ref Range Status   01/11/2023 40.1 37.0 - 48.5 % Final     Platelets   Date Value Ref Range Status   01/11/2023 272 150 - 450 K/uL Final     MCV   Date Value Ref Range Status   01/11/2023 92 82 - 98 fL Final     RDW   Date Value Ref Range Status   01/11/2023 13.8 11.5 - 14.5 % Final     BMP:   Sodium   Date Value Ref Range Status   01/11/2023 141 136 - 145 mmol/L Final     Potassium   Date Value Ref Range Status   01/11/2023 4.4 3.5 - 5.1 mmol/L Final     Chloride   Date Value Ref Range Status   01/11/2023 105 95 - 110 mmol/L Final     CO2   Date Value Ref Range Status   01/11/2023 26 23 - 29 mmol/L Final     BUN   Date Value Ref Range Status   01/11/2023 13 8 - 23 mg/dL Final     Creatinine   Date Value Ref Range Status   01/11/2023 0.6 0.5 - 1.4 mg/dL Final     Glucose   Date Value Ref Range Status   01/11/2023 99 70 - 110 mg/dL Final     Calcium   Date Value Ref Range Status   01/11/2023 10.3 8.7 - 10.5 mg/dL Final     LFTs:   Total Protein   Date Value Ref Range Status   01/11/2023 6.9 6.0 - 8.4 g/dL Final     Albumin   Date Value Ref Range Status   01/11/2023 3.9 3.5 - 5.2 g/dL Final     Total Bilirubin  "  Date Value Ref Range Status   01/11/2023 0.3 0.1 - 1.0 mg/dL Final     Comment:     For infants and newborns, interpretation of results should be based  on gestational age, weight and in agreement with clinical  observations.    Premature Infant recommended reference ranges:  Up to 24 hours.............<8.0 mg/dL  Up to 48 hours............<12.0 mg/dL  3-5 days..................<15.0 mg/dL  6-29 days.................<15.0 mg/dL       AST   Date Value Ref Range Status   01/11/2023 16 10 - 40 U/L Final     Alkaline Phosphatase   Date Value Ref Range Status   01/11/2023 68 55 - 135 U/L Final     ALT   Date Value Ref Range Status   01/11/2023 13 10 - 44 U/L Final     Coags: No results found for: "INR", "PROTIME", "PTT"  FLP:      Lab Results   Component Value Date    CHOL 184 01/11/2023    CHOL 186 03/18/2022    CHOL 188 05/25/2021     Lab Results   Component Value Date    HDL 57 01/11/2023    HDL 53 03/18/2022    HDL 61 05/25/2021     Lab Results   Component Value Date    LDLCALC 112.4 01/11/2023    LDLCALC 115.0 03/18/2022    LDLCALC 109.6 05/25/2021     Lab Results   Component Value Date    TRIG 73 01/11/2023    TRIG 90 03/18/2022    TRIG 87 05/25/2021     Lab Results   Component Value Date    CHOLHDL 31.0 01/11/2023    CHOLHDL 28.5 03/18/2022    CHOLHDL 32.4 05/25/2021      DM:      Hemoglobin A1C   Date Value Ref Range Status   01/11/2023 6.0 (H) 4.0 - 5.6 % Final     Comment:     ADA Screening Guidelines:  5.7-6.4%  Consistent with prediabetes  >or=6.5%  Consistent with diabetes    High levels of fetal hemoglobin interfere with the HbA1C  assay. Heterozygous hemoglobin variants (HbS, HgC, etc)do  not significantly interfere with this assay.   However, presence of multiple variants may affect accuracy.     03/18/2022 5.8 (H) 4.0 - 5.6 % Final     Comment:     ADA Screening Guidelines:  5.7-6.4%  Consistent with prediabetes  >or=6.5%  Consistent with diabetes    High levels of fetal hemoglobin interfere with the " "HbA1C  assay. Heterozygous hemoglobin variants (HbS, HgC, etc)do  not significantly interfere with this assay.   However, presence of multiple variants may affect accuracy.       LDL Cholesterol   Date Value Ref Range Status   01/11/2023 112.4 63.0 - 159.0 mg/dL Final     Comment:     The National Cholesterol Education Program (NCEP) has set the  following guidelines (reference values) for LDL Cholesterol:  Optimal.......................<130 mg/dL  Borderline High...............130-159 mg/dL  High..........................160-189 mg/dL  Very High.....................>190 mg/dL       Creatinine   Date Value Ref Range Status   01/11/2023 0.6 0.5 - 1.4 mg/dL Final     Thyroid:   TSH   Date Value Ref Range Status   01/11/2023 1.307 0.400 - 4.000 uIU/mL Final     Free T4   Date Value Ref Range Status   01/11/2023 0.96 0.71 - 1.51 ng/dL Final     Anemia:   Vitamin B-12   Date Value Ref Range Status   07/14/2023 783 210 - 950 pg/mL Final     Folate   Date Value Ref Range Status   07/14/2023 8.4 4.0 - 24.0 ng/mL Final     Cardiac: No results found for: "TROPONINI", "CKTOTAL", "CKMB", "BNP"  Urinalysis:   Color, UA   Date Value Ref Range Status   01/28/2023 Yellow Yellow, Straw, January Final     Specific Gravity, UA   Date Value Ref Range Status   01/28/2023 1.015 1.005 - 1.030 Final     Nitrite, UA   Date Value Ref Range Status   01/28/2023 Negative Negative Final     Ketones, UA   Date Value Ref Range Status   01/28/2023 Negative Negative Final     Urobilinogen, UA   Date Value Ref Range Status   03/03/2018 Negative <2.0 EU/dL Final     WBC, UA   Date Value Ref Range Status   01/28/2023 9 (H) 0 - 5 /hpf Final       Other Results:  EKG (my interpretation):     Radiology:  MRI Brain Without Contrast  Narrative: EXAMINATION:  MRI BRAIN WITHOUT CONTRAST    CLINICAL HISTORY:  Memory loss;.  Other amnesia    TECHNIQUE:  Multiplanar multisequence MR imaging of the brain was performed without contrast.    COMPARISON:  No relevant " priors.    FINDINGS:  Intracranial compartment:    Mild ventricular widening consistent with stable mild central volume loss.  No evidence of hydrocephalus.  There may be an element of hippocampal volume loss particularly on the left.    No extra-axial blood or fluid collections.  No mass effect.    No intracranial hemorrhage or acute infarct.    Scattered focal T2 FLAIR hyperintensities within the periventricular and subcortical white matter nonspecific but may reflect mild with chronic microvascular ischemia.  No evidence of prior territorial or cortical infarct..    Normal vascular flow voids are preserved at the skull base.    Skull/extracranial contents (limited evaluation): Bone marrow signal intensity is normal.  The visualized sinuses and mastoid air cells are essentially clear.  Impression: No acute intracranial process    Mild volume loss.  No advanced ischemic changes identified.  No structural abnormality.    Electronically signed by resident: Andres Ray  Date:    07/26/2023  Time:    09:00    Electronically signed by: Andres Young  Date:    07/26/2023  Time:    10:30          Assessment/Plan     Alyce Lewis is a 78 y.o. female with:  1. Mild cognitive impairment  Assessment & Plan:  Will hold ditropan and buspar to determine if these meds caused cognitive slowing.  Avoid all meds that can exacerbate MCI      2. Preventative health care  Assessment & Plan:  Prevnar 20 today   Get flu shot   Refer to Derm for screening   Labs in 2 months (fasting avoid eating x 8 hours) to check FLP on lipitor 40mg daily, HbA1C, and routine labs        Orders:  -     (In Office Administered) Pneumococcal Conjugate Vaccine (20 Valent) (IM) (Preferred)  -     Pneumococcal Polysaccharide Vaccine (23 Valent) (SQ/IM)    3. Overactive bladder  Assessment & Plan:  Trial of stopping Ditropan - can switch to Myrbetriq if needed    Orders:  -     mirabegron (MYRBETRIQ) 25 mg Tb24 ER tablet; Take 1 tablet (25 mg total) by  mouth once daily.  Dispense: 30 tablet; Refill: 11    4. Migraine syndrome  Assessment & Plan:  take Motrin 400mg with food then Maxalt prn severe headache  Lie in dark, quiet room         5. Personal history of malignant melanoma of skin  -     Ambulatory referral/consult to Dermatology; Future; Expected date: 12/27/2023    6. Mixed hyperlipidemia  Assessment & Plan:  Change to lipitor 40mg daily since 10-year CV risk 20.3% and stop zocor 40mg daily   Check lipids in 2 months    Orders:  -     Lipid Panel; Future; Expected date: 12/20/2023  -     atorvastatin (LIPITOR) 40 MG tablet; Take 1 tablet (40 mg total) by mouth once daily.  Dispense: 90 tablet; Refill: 3    7. Prediabetes  Assessment & Plan:  -Check Bps at home weekly and prn symptoms for goal BP <130/80.  -Start healthy diet high in fiber (25-35 gm daily), low fat dairy, lean protein, low in saturated/trans fat (minimize cheese, creamy salad dressings); high in calcium/magnesium/potassium; 1.5 gm sodium diet; low in processed sugars and foods, ie  WHITE sugars, bread, pasta, rice, ice cream, cookies, cake, doughnuts  -Drink 6-8 glasses of water daily  -Moderate exercise 30 min 5 times per week or 75 min intense exercise biweekly   -Start 8-10 hour intermittent fasting diet   -Avoid eating 3 hours prior to bedtime  -      Orders:  -     Magnesium; Future; Expected date: 12/20/2023  -     Hemoglobin A1C; Future; Expected date: 12/20/2023  -     Microalbumin/Creatinine Ratio, Urine; Future; Expected date: 12/20/2023  -     Urinalysis; Future; Expected date: 12/20/2023  -     Comprehensive Metabolic Panel; Future; Expected date: 12/20/2023  -     CBC Without Differential; Future; Expected date: 12/20/2023    8. Malignant melanoma of right upper extremity including shoulder  Overview:  Dx'ed and removed in 2010    Assessment & Plan:  Refer to derm for surveillance      9. Malignant neoplasm of lower-outer quadrant of right breast of female, estrogen receptor  negative  Overview:  S/p lumpectomy in 2005    Assessment & Plan:  Cont surveillance with MMG annually      10. NOHEMY (generalized anxiety disorder)  Overview:  X 20 years or greater    Assessment & Plan:  stable on Zoloft 100mg daily  Take buspar as needed only if efficacious - if not then stop               I spent a total of 66 minutes on the day of the visit.This includes face to face time and non-face to face time preparing to see the patient (eg, review of tests), obtaining and/or reviewing separately obtained history, documenting clinical information in the electronic or other health record, independently interpreting results and communicating results to the patient/family/caregiver, or care coordinator.   Code Status:     Elvi Mancuso MD

## 2023-12-21 NOTE — ASSESSMENT & PLAN NOTE
Will hold ditropan and buspar to determine if these meds caused cognitive slowing.  Avoid all meds that can exacerbate MCI

## 2023-12-26 ENCOUNTER — TELEPHONE (OUTPATIENT)
Dept: PRIMARY CARE CLINIC | Facility: CLINIC | Age: 78
End: 2023-12-26
Payer: MEDICARE

## 2023-12-28 DIAGNOSIS — Z78.0 MENOPAUSE: ICD-10-CM

## 2024-01-27 NOTE — TELEPHONE ENCOUNTER
Care Due:                  Date            Visit Type   Department     Provider  --------------------------------------------------------------------------------                                             [facility]  Last Visit: 12-      [facility]           Elvi Mancuso  Next Visit: None Scheduled  None         None Found                                                            Last  Test          Frequency    Reason                     Performed    Due Date  --------------------------------------------------------------------------------    CMP.........  12 months..  atorvastatin, simvastatin  01- 01-    Lipid Panel.  12 months..  atorvastatin, simvastatin  01- 01-    [facility] Embedded Care Due Messages. Reference number: [id number].   1/27/2024 6:47:12 AM CST

## 2024-01-28 RX ORDER — METOPROLOL SUCCINATE 25 MG/1
25 TABLET, EXTENDED RELEASE ORAL
Qty: 90 TABLET | Refills: 3 | Status: SHIPPED | OUTPATIENT
Start: 2024-01-28

## 2024-01-28 NOTE — TELEPHONE ENCOUNTER
Refill Routing Note   Medication(s) are not appropriate for processing by [facility] for the following reason(s):        No active prescription written by provider    [facility] action(s):  Defer        Medication Therapy Plan: FLOS      Appointments  past 12m or future 3m with PCP    Date Provider   Last Visit   12/20/2023 Elvi Mancuso MD   Next Visit   Visit date not found Elvi Mancuso MD   ED visits in past 90 days: 0        Note composed:4:57 AM 01/28/2024

## 2024-01-29 ENCOUNTER — TELEPHONE (OUTPATIENT)
Dept: OBSTETRICS AND GYNECOLOGY | Facility: CLINIC | Age: 79
End: 2024-01-29
Payer: MEDICARE

## 2024-01-29 DIAGNOSIS — Z12.31 VISIT FOR SCREENING MAMMOGRAM: Primary | ICD-10-CM

## 2024-01-29 NOTE — TELEPHONE ENCOUNTER
----- Message from Ashia Franco sent at 1/29/2024 10:38 AM CST -----  Type:  Mammogram    Caller is requesting to schedule their annual mammogram appointment.  Order is not listed in EPIC.  Please enter order and contact patient to schedule.  Name of Caller:pt  Where would they like the mammogram performed?[facility]   Would the patient rather a call back or a response via [facility]? call  Best Call Back Number:[phone]  Additional Information: [phone] number to call so they will do the Mammo [facility]   Pleae call pt when orders are in so she can schedule her mammogram

## 2024-01-29 NOTE — TELEPHONE ENCOUNTER
----- Message from Ashia Franco sent at 1/29/2024 10:38 AM CST -----  Type:  Mammogram    Caller is requesting to schedule their annual mammogram appointment.  Order is not listed in EPIC.  Please enter order and contact patient to schedule.  Name of Caller:pt  Where would they like the mammogram performed?[facility]   Would the patient rather a call back or a response via [facility]? call  Best Call Back Number:[phone]  Additional Information: [phone] number to call so they will do the Mammo [facility]   Pleae call pt when orders are in so she can schedule her mammogram

## 2024-01-31 DIAGNOSIS — G43.909 MIGRAINE SYNDROME: ICD-10-CM

## 2024-01-31 RX ORDER — RIZATRIPTAN BENZOATE 10 MG/1
TABLET, ORALLY DISINTEGRATING ORAL
Qty: 9 TABLET | Refills: 11 | Status: SHIPPED | OUTPATIENT
Start: 2024-01-31

## 2024-01-31 NOTE — TELEPHONE ENCOUNTER
No care due was identified.  [facility] Embedded Care Due Messages. Reference number: [id number].   1/31/2024 12:01:28 AM CST

## 2024-01-31 NOTE — TELEPHONE ENCOUNTER
Refill Routing Note   Medication(s) are not appropriate for processing by [facility] for the following reason(s):        No active prescription written by provider    [facility] action(s):  Defer        Medication Therapy Plan: The patient recently changed PCP in 12/2023. No current order under Dr. Mancuso at this time      Appointments  past 12m or future 3m with PCP    Date Provider   Last Visit   12/20/2023 Elvi Mancuso MD   Next Visit   Visit date not found Elvi Mancuso MD   ED visits in past 90 days: 0        Note composed:6:17 AM 01/31/2024

## 2024-02-27 DIAGNOSIS — Z00.00 ENCOUNTER FOR MEDICARE ANNUAL WELLNESS EXAM: ICD-10-CM

## 2024-03-08 ENCOUNTER — TELEPHONE (OUTPATIENT)
Dept: RADIOLOGY | Facility: HOSPITAL | Age: 79
End: 2024-03-08
Payer: MEDICARE

## 2024-03-08 NOTE — TELEPHONE ENCOUNTER
----- Message from Marlene Hannah sent at 3/8/2024 10:15 AM CST -----  Regarding: imaging  Name of Who is Calling:  Pt called         What is the request in detail:  The pt would like  to reschedule her apt . Please advise         Can the clinic reply by [facility]:    No       What Number to Call Back if not in [facility]: Telephone Information:  Mobile          [phone]

## 2024-03-11 ENCOUNTER — LAB VISIT (OUTPATIENT)
Dept: LAB | Facility: HOSPITAL | Age: 79
End: 2024-03-11
Attending: INTERNAL MEDICINE
Payer: MEDICARE

## 2024-03-11 DIAGNOSIS — E78.2 MIXED HYPERLIPIDEMIA: ICD-10-CM

## 2024-03-11 DIAGNOSIS — R73.03 PREDIABETES: ICD-10-CM

## 2024-03-11 LAB
ALBUMIN SERPL BCP-MCNC: 3.9 G/DL (ref 3.5–5.2)
ALP SERPL-CCNC: 77 U/L (ref 55–135)
ALT SERPL W/O P-5'-P-CCNC: 19 U/L (ref 10–44)
ANION GAP SERPL CALC-SCNC: 8 MMOL/L (ref 8–16)
AST SERPL-CCNC: 20 U/L (ref 10–40)
BILIRUB SERPL-MCNC: 0.4 MG/DL (ref 0.1–1)
BUN SERPL-MCNC: 15 MG/DL (ref 8–23)
CALCIUM SERPL-MCNC: 10.2 MG/DL (ref 8.7–10.5)
CHLORIDE SERPL-SCNC: 105 MMOL/L (ref 95–110)
CHOLEST SERPL-MCNC: 192 MG/DL (ref 120–199)
CHOLEST/HDLC SERPL: 4.1 {RATIO} (ref 2–5)
CO2 SERPL-SCNC: 27 MMOL/L (ref 23–29)
CREAT SERPL-MCNC: 0.7 MG/DL (ref 0.5–1.4)
ERYTHROCYTE [DISTWIDTH] IN BLOOD BY AUTOMATED COUNT: 13.7 % (ref 11.5–14.5)
EST. GFR  (NO RACE VARIABLE): >60 ML/MIN/1.73 M^2
ESTIMATED AVG GLUCOSE: 120 MG/DL (ref 68–131)
GLUCOSE SERPL-MCNC: 92 MG/DL (ref 70–110)
HBA1C MFR BLD: 5.8 % (ref 4–5.6)
HCT VFR BLD AUTO: 41 % (ref 37–48.5)
HDLC SERPL-MCNC: 47 MG/DL (ref 40–75)
HDLC SERPL: 24.5 % (ref 20–50)
HGB BLD-MCNC: 13.5 G/DL (ref 12–16)
LDLC SERPL CALC-MCNC: 127 MG/DL (ref 63–159)
MAGNESIUM SERPL-MCNC: 2 MG/DL (ref 1.6–2.6)
MCH RBC QN AUTO: 30.4 PG (ref 27–31)
MCHC RBC AUTO-ENTMCNC: 32.9 G/DL (ref 32–36)
MCV RBC AUTO: 92 FL (ref 82–98)
NONHDLC SERPL-MCNC: 145 MG/DL
PLATELET # BLD AUTO: 271 K/UL (ref 150–450)
PMV BLD AUTO: 10.4 FL (ref 9.2–12.9)
POTASSIUM SERPL-SCNC: 4.1 MMOL/L (ref 3.5–5.1)
PROT SERPL-MCNC: 7 G/DL (ref 6–8.4)
RBC # BLD AUTO: 4.44 M/UL (ref 4–5.4)
SODIUM SERPL-SCNC: 140 MMOL/L (ref 136–145)
TRIGL SERPL-MCNC: 90 MG/DL (ref 30–150)
WBC # BLD AUTO: 6.33 K/UL (ref 3.9–12.7)

## 2024-03-11 PROCEDURE — 85027 COMPLETE CBC AUTOMATED: CPT | Performed by: INTERNAL MEDICINE

## 2024-03-11 PROCEDURE — 83735 ASSAY OF MAGNESIUM: CPT | Performed by: INTERNAL MEDICINE

## 2024-03-11 PROCEDURE — 36415 COLL VENOUS BLD VENIPUNCTURE: CPT | Performed by: INTERNAL MEDICINE

## 2024-03-11 PROCEDURE — 80061 LIPID PANEL: CPT | Performed by: INTERNAL MEDICINE

## 2024-03-11 PROCEDURE — 80053 COMPREHEN METABOLIC PANEL: CPT | Performed by: INTERNAL MEDICINE

## 2024-03-11 PROCEDURE — 83036 HEMOGLOBIN GLYCOSYLATED A1C: CPT | Performed by: INTERNAL MEDICINE

## 2024-03-14 ENCOUNTER — HOSPITAL ENCOUNTER (OUTPATIENT)
Dept: RADIOLOGY | Facility: HOSPITAL | Age: 79
Discharge: HOME | End: 2024-03-14
Attending: OBSTETRICS & GYNECOLOGY
Payer: MEDICARE

## 2024-03-14 VITALS — HEIGHT: 62 IN | BODY MASS INDEX: 28.34 KG/M2 | WEIGHT: 154 LBS

## 2024-03-14 DIAGNOSIS — Z12.31 VISIT FOR SCREENING MAMMOGRAM: ICD-10-CM

## 2024-03-14 PROCEDURE — 77067 SCR MAMMO BI INCL CAD: CPT | Mod: 26,,, | Performed by: RADIOLOGY

## 2024-03-14 PROCEDURE — 77063 BREAST TOMOSYNTHESIS BI: CPT | Mod: 26,,, | Performed by: RADIOLOGY

## 2024-03-14 PROCEDURE — 77067 SCR MAMMO BI INCL CAD: CPT | Mod: TC

## 2024-03-15 ENCOUNTER — PATIENT MESSAGE (OUTPATIENT)
Dept: ADMINISTRATIVE | Facility: OTHER | Age: 79
End: 2024-03-15
Payer: MEDICARE

## 2024-03-25 ENCOUNTER — PATIENT MESSAGE (OUTPATIENT)
Dept: PRIMARY CARE CLINIC | Facility: CLINIC | Age: 79
End: 2024-03-25
Payer: MEDICARE

## 2024-03-25 PROBLEM — Z00.00 PREVENTATIVE HEALTH CARE: Status: RESOLVED | Noted: 2023-12-20 | Resolved: 2024-03-25

## 2024-03-25 NOTE — TELEPHONE ENCOUNTER
Hi Dr Mancuso see my chart message regarding Cholesterol meds Pt is confused as to which she should be taking. I advised her to Continue taking the Atorvastatin. Should she be taking both or will you be increasing the dosage?

## 2024-03-26 ENCOUNTER — HOSPITAL ENCOUNTER (OUTPATIENT)
Dept: RADIOLOGY | Facility: HOSPITAL | Age: 79
Discharge: HOME | End: 2024-03-26
Attending: INTERNAL MEDICINE
Payer: MEDICARE

## 2024-03-26 DIAGNOSIS — Z78.0 MENOPAUSE: ICD-10-CM

## 2024-03-26 PROCEDURE — 77080 DXA BONE DENSITY AXIAL: CPT | Mod: 26,,, | Performed by: INTERNAL MEDICINE

## 2024-03-26 PROCEDURE — 77080 DXA BONE DENSITY AXIAL: CPT | Mod: TC

## 2024-04-01 RX ORDER — SERTRALINE HYDROCHLORIDE 100 MG/1
100 TABLET, FILM COATED ORAL DAILY
Qty: 90 TABLET | Refills: 2 | Status: SHIPPED | OUTPATIENT
Start: 2024-04-01

## 2024-04-01 NOTE — TELEPHONE ENCOUNTER
Refill Routing Note   Medication(s) are not appropriate for processing by [facility] for the following reason(s):        No active prescription written by provider    [facility] action(s):  Defer               Appointments  past 12m or future 3m with PCP    Date Provider   Last Visit   12/20/2023 Elvi Mancuso MD   Next Visit   Visit date not found Elvi Mancuso MD   ED visits in past 90 days: 0        Note composed:5:12 AM 04/01/2024

## 2024-04-23 NOTE — TELEPHONE ENCOUNTER
Refill Routing Note   Medication(s) are not appropriate for processing by [facility] for the following reason(s):        Outside of protocol    [facility] action(s):  Route               Appointments  past 12m or future 3m with PCP    Date Provider   Last Visit   1/11/2023 Demario Small MD   Next Visit   Visit date not found Demario Small MD   ED visits in past 90 days: 0        Note composed:4:47 PM 04/23/2024

## 2024-04-23 NOTE — TELEPHONE ENCOUNTER
No care due was identified.  [facility] Embedded Care Due Messages. Reference number: [id number].   4/23/2024 3:27:04 PM CDT

## 2024-04-24 RX ORDER — BUSPIRONE HYDROCHLORIDE 15 MG/1
15 TABLET ORAL 2 TIMES DAILY
Qty: 60 TABLET | Refills: 11 | Status: SHIPPED | OUTPATIENT
Start: 2024-04-24

## 2024-05-21 NOTE — TELEPHONE ENCOUNTER
No care due was identified.  [facility] Embedded Care Due Messages. Reference number: [id number].   5/21/2024 1:32:59 PM CDT

## 2024-05-22 RX ORDER — OXYBUTYNIN CHLORIDE 10 MG/1
10 TABLET, EXTENDED RELEASE ORAL DAILY
Qty: 90 TABLET | Refills: 1 | Status: SHIPPED | OUTPATIENT
Start: 2024-05-22 | End: 2024-06-06

## 2024-05-22 NOTE — TELEPHONE ENCOUNTER
Refill Routing Note   Medication(s) are not appropriate for processing by [facility] for the following reason(s):        No active prescription written by provider    [facility] action(s):  Defer      Medication Therapy Plan: Patient has recently changed PCP's. Recent OV (12/20/23) but no current order under new PCP.      Appointments  past 12m or future 3m with PCP    Date Provider   Last Visit   12/20/2023 Elvi Mancuso MD   Next Visit   Visit date not found Elvi Mancuso MD   ED visits in past 90 days: 0        Note composed:12:09 AM 05/22/2024

## 2024-06-03 ENCOUNTER — PATIENT MESSAGE (OUTPATIENT)
Dept: INTERNAL MEDICINE | Facility: CLINIC | Age: 79
End: 2024-06-03
Payer: MEDICARE

## 2024-06-04 ENCOUNTER — PATIENT MESSAGE (OUTPATIENT)
Dept: PRIMARY CARE CLINIC | Facility: CLINIC | Age: 79
End: 2024-06-04
Payer: MEDICARE

## 2024-06-11 ENCOUNTER — PATIENT MESSAGE (OUTPATIENT)
Dept: ADMINISTRATIVE | Facility: OTHER | Age: 79
End: 2024-06-11
Payer: MEDICARE

## 2024-07-08 ENCOUNTER — PATIENT MESSAGE (OUTPATIENT)
Dept: PRIMARY CARE CLINIC | Facility: CLINIC | Age: 79
End: 2024-07-08
Payer: MEDICARE

## 2024-07-08 DIAGNOSIS — J30.2 SEASONAL ALLERGIC RHINITIS, UNSPECIFIED TRIGGER: ICD-10-CM

## 2024-07-08 RX ORDER — FLUTICASONE PROPIONATE 50 MCG
SPRAY, SUSPENSION (ML) NASAL
Qty: 16 ML | Refills: 11 | OUTPATIENT
Start: 2024-07-08

## 2024-07-08 NOTE — TELEPHONE ENCOUNTER
No care due was identified.  [facility] Embedded Care Due Messages. Reference number: [id number].   7/08/2024 4:52:23 PM CDT

## 2024-07-09 RX ORDER — SIMVASTATIN 40 MG/1
40 TABLET, FILM COATED ORAL NIGHTLY
Qty: 30 TABLET | Refills: 11 | Status: SHIPPED | OUTPATIENT
Start: 2024-07-09

## 2024-07-09 NOTE — TELEPHONE ENCOUNTER
Refill Routing Note   Medication(s) are not appropriate for processing by [facility] for the following reason(s):        No active prescription written by provider    [facility] action(s):  Defer      Medication Therapy Plan: The provider responsible for managing the medication isnt PCP      Appointments  past 12m or future 3m with PCP    Date Provider   Last Visit   12/20/2023 Elvi Mancuso MD   Next Visit   Visit date not found Elvi Mancuso MD   ED visits in past 90 days: 0        Note composed:10:07 AM 07/09/2024

## 2024-07-15 ENCOUNTER — TELEPHONE (OUTPATIENT)
Dept: OTOLARYNGOLOGY | Facility: CLINIC | Age: 79
End: 2024-07-15
Payer: MEDICARE

## 2024-07-15 NOTE — TELEPHONE ENCOUNTER
----- Message from Asuncion Baker RN sent at 7/12/2024  2:57 PM CDT -----  Regarding: FW: Appt  Contact: [phone] daughter    ----- Message -----  From: Jessica Sykes  Sent: 7/12/2024   9:49 AM CDT  To: Bert GONZALEZ Staff  Subject: Appt                                             Type:  Needs Medical Advice    Who Called: Monae (daughter)  Symptoms (please be specific): vertigo and ear pain   How long has patient had these symptoms:  3/4 weeks    Pt has an appointment scheduled for October and experiencing pain    Would the patient rather a call back or a response via [facility]? Call back  Best Call Back Number: [phone] prefer calling daughter [phone]

## 2024-07-31 ENCOUNTER — OFFICE VISIT (OUTPATIENT)
Dept: OTOLARYNGOLOGY | Facility: CLINIC | Age: 79
End: 2024-07-31
Payer: MEDICARE

## 2024-07-31 ENCOUNTER — CLINICAL SUPPORT (OUTPATIENT)
Dept: AUDIOLOGY | Facility: CLINIC | Age: 79
End: 2024-07-31
Payer: MEDICARE

## 2024-07-31 VITALS — DIASTOLIC BLOOD PRESSURE: 86 MMHG | HEART RATE: 66 BPM | SYSTOLIC BLOOD PRESSURE: 138 MMHG

## 2024-07-31 DIAGNOSIS — L29.9 EAR ITCHING: Primary | ICD-10-CM

## 2024-07-31 DIAGNOSIS — R42 INTERMITTENT LIGHTHEADEDNESS: ICD-10-CM

## 2024-07-31 DIAGNOSIS — R42 DIZZINESS: ICD-10-CM

## 2024-07-31 DIAGNOSIS — H92.03 EAR PAIN, BILATERAL: ICD-10-CM

## 2024-07-31 DIAGNOSIS — G43.909 MIGRAINE SYNDROME: ICD-10-CM

## 2024-07-31 DIAGNOSIS — H90.3 SENSORINEURAL HEARING LOSS (SNHL), BILATERAL: Primary | ICD-10-CM

## 2024-07-31 PROCEDURE — 92567 TYMPANOMETRY: CPT | Mod: PBBFAC | Performed by: AUDIOLOGIST

## 2024-07-31 PROCEDURE — 99204 OFFICE O/P NEW MOD 45 MIN: CPT | Mod: S$PBB,,, | Performed by: OTOLARYNGOLOGY

## 2024-07-31 PROCEDURE — 99213 OFFICE O/P EST LOW 20 MIN: CPT | Mod: PBBFAC | Performed by: OTOLARYNGOLOGY

## 2024-07-31 PROCEDURE — 99999 PR PBB SHADOW E&M-EST. PATIENT-LVL III: CPT | Mod: PBBFAC,,, | Performed by: OTOLARYNGOLOGY

## 2024-07-31 PROCEDURE — 92557 COMPREHENSIVE HEARING TEST: CPT | Mod: PBBFAC | Performed by: AUDIOLOGIST

## 2024-07-31 NOTE — PATIENT INSTRUCTIONS
Discussed ear care. Agree with no use of q-tips or other instruments in the ears. Recommend applying small amount of baby oil, mineral oil (or similar) or moisturizing lotion to outer ear canal opening a couple times per week to moisturize skin and hopefully lessen itching.    Monitor episodes of lightheadedness - make sure fluid intake is adequate, consider checking heart rate and blood pressure when episodes occur.    Hearing test reviewed - mild high frequency sensorineural hearing loss bilaterally. Recommend recheck hearing in 1-2 years.     Otherwise, follow up with our clinic for any ear, nose or throat problems ([phone]).

## 2024-07-31 NOTE — PROGRESS NOTES
77 y/o female self referred presenting to clinic with her daughter for evaluation of ear itching and dizziness. Has had trouble with ear itching (L>R) for at least a couple months. Intermittent. No drainage. Aggravating but not painful. Pushes on outer ear. Does not dig in ear or use q-tips. No other psoriasis, eczema or scalp issues.    For months intermittently has episodes of lightheadedness when she is doing tasks. Does not always happen with the same movement. Sometimes goes many days w/o symptoms. No spinning. Feels off when it comes on. Sits down and it goes away. No associated otologic symptoms. On BP monitoring program w/ [facility]. Has not checked BP during episode.     Was on a few other meds - switched to new PCP earlier this year and med list pared down. Stopped Flexeril & bladder med. Cholesterol med switched.    Had cognitive testing - daughter reports she is doing fine.    Has hx of migraines - started when she was 17 y/o. Used to get frequently. Happens less often now. Uses Rizatriptan as needed for episodes.     No ear surgery, ear trauma, head trauma, ear drainage, unilateral tinnitus, unilateral ear pressure, acute hearing loss associated with episodes of dizziness.  No known family hx of early hearing loss or ear problems.    PMHx, PSHx, Meds, Allergies, SocHx, FamHx reviewed in Epic and updated appropriate to this visit. Of note hx of BRCA years ago and R UE melanoma years ago.    Review of Systems     Constitutional: Negative for chills and fever.      HENT: Negative for ear discharge, ear infection, ear pain, hearing loss, sinus pressure, sore throat, stuffy nose and trouble swallowing.      Neurological: Positive for dizziness, headaches and light-headedness.       PE: /86   Pulse 66   Gen: female, WN, WD, NAD, cooperative and alert, good historian  Eyes: no spontaneous nystagmus, PERRL  Ears: min cerumen on right, few small dry flakes of cerumen skin laterally in left EAC;  translucent  TMs bilaterally, normal bony landmarks  Nose: external wnl, septum midline, turbinates WNL, clear drainage, no visible polyps  OC/OP: tongue midline, teeth in good repair, MMM, tonsils 1+ symmetric, FOM/ BOT soft, no mucosal lesions, mild inferior canine gingival recession, no erythema or exudate  Neck: no LAD or masses, no bruits  Face: no TTP over sinuses  Chest: equal chest excursion, no retrations  Skin: no visible concerning lesions of face, dry and intact  Lymph: no neck LAD  Neuro: CN II-VII, IX - XII intact, finger to nose testing brisk and intact, EOM intact, Romberg negative, no visible spontaneous nystagmus, no ataxia, Head impulse test negative, , [place] negative  Psych: alert & oriented x 3, reasonable, normal affect          7/28/2024     5:52 PM   Scores of the Dizziness Handicap Inventory   Total DHI Functional Score 16   Total DHI Emotional Score 14       Audio      Impression:   1. Ear itching        2. Intermittent lightheadedness        3. Migraine syndrome            Discussion and Plan:    iscussed ear care. Agree with no use of q-tips or other instruments in the ears. Recommend applying small amount of baby oil, mineral oil (or similar) or moisturizing lotion to outer ear canal opening a couple times per week to moisturize skin and hopefully lessen itching.    Monitor episodes of lightheadedness - make sure fluid intake is adequate, consider checking heart rate and blood pressure when episodes occur.    Hearing test reviewed - mild high frequency sensorineural hearing loss bilaterally. Recommend recheck hearing in 1-2 years.     Otherwise, follow up with our clinic for any ear, nose or throat problems ([phone]).     Parts or all of this note were created by voice recognition software; typographical errors in translating may be present.

## 2024-07-31 NOTE — PROGRESS NOTES
7/31/2024    AUDIOLOGICAL EVALUATION:    Alyce Lewis was seen for an audiological evaluation on 7/31/2024 for ear pain and dizziness.  Mrs. Lewis reported her ears did not feel right and some imbalance for the past few weeks.  There was no spinning sensation reported and no tinnitus reported.    Pure tone threshold testing revealed a mild high frequency sensorineural hearing loss bilaterally.  Speech reception thresholds were obtained at 25 dBHL for the right ear and 20 dBHL for the left ear.  Speech discrimination scores were obtained at 100% for the right ear and 100% for the left ear.    Tympanometry was within normal limits bilaterally indicating normal middle ear function.    Recommend:  Otologic evaluation.  Follow up audio in one year or sooner if necessary.

## 2024-08-25 DIAGNOSIS — J30.2 SEASONAL ALLERGIC RHINITIS, UNSPECIFIED TRIGGER: ICD-10-CM

## 2024-08-26 RX ORDER — FLUTICASONE PROPIONATE 50 MCG
SPRAY, SUSPENSION (ML) NASAL
Qty: 48 ML | Refills: 3 | Status: SHIPPED | OUTPATIENT
Start: 2024-08-26

## 2024-08-26 NOTE — TELEPHONE ENCOUNTER
No care due was identified.  [facility] Embedded Care Due Messages. Reference number: [id number].   8/26/2024 11:04:24 AM CDT

## 2024-08-26 NOTE — TELEPHONE ENCOUNTER
Refill Routing Note   Medication(s) are not appropriate for processing by [facility] for the following reason(s):        No active prescription written by provider    [facility] action(s):  Defer               Appointments  past 12m or future 3m with PCP    Date Provider   Last Visit   12/20/2023 Elvi Mancuso MD   Next Visit   12/10/2024 Elvi Mancuso MD   ED visits in past 90 days: 0        Note composed:11:05 AM 08/26/2024

## 2024-09-15 DIAGNOSIS — E78.2 MIXED HYPERLIPIDEMIA: ICD-10-CM

## 2024-09-15 NOTE — TELEPHONE ENCOUNTER
No care due was identified.  [facility] Embedded Care Due Messages. Reference number: [id number].   9/15/2024 3:35:05 PM CDT

## 2024-09-16 RX ORDER — ATORVASTATIN CALCIUM 40 MG/1
40 TABLET, FILM COATED ORAL
Qty: 90 TABLET | Refills: 1 | Status: SHIPPED | OUTPATIENT
Start: 2024-09-16

## 2024-09-16 NOTE — TELEPHONE ENCOUNTER
Refill Decision Note   Alyce Lewis  is requesting a refill authorization.  Brief Assessment and Rationale for Refill:  Approve     Medication Therapy Plan:         Comments:     Note composed:3:21 PM 09/16/2024

## 2024-09-17 NOTE — TELEPHONE ENCOUNTER
No care due was identified.  [facility] Embedded Care Due Messages. Reference number: [id number].   9/17/2024 9:14:04 AM CDT

## 2024-09-18 RX ORDER — OXYBUTYNIN CHLORIDE 10 MG/1
10 TABLET, EXTENDED RELEASE ORAL
Qty: 90 TABLET | Refills: 1 | OUTPATIENT
Start: 2024-09-18

## 2024-09-18 NOTE — TELEPHONE ENCOUNTER
Quick DC. Inappropriate Request    Refill Authorization Note   Alyce Lewis  is requesting a refill authorization.  Brief Assessment and Rationale for Refill:  Quick Discontinue  Medication Therapy Plan:  discontinued on 6/6/2024 by Elvi Mancuso MD.    Medication Reconciliation Completed:  No      Comments:     Note composed:2:58 AM 09/18/2024

## 2024-12-10 ENCOUNTER — OFFICE VISIT (OUTPATIENT)
Dept: PRIMARY CARE CLINIC | Facility: CLINIC | Age: 79
End: 2024-12-10
Payer: MEDICARE

## 2024-12-10 VITALS
HEIGHT: 62 IN | SYSTOLIC BLOOD PRESSURE: 124 MMHG | HEART RATE: 84 BPM | DIASTOLIC BLOOD PRESSURE: 80 MMHG | BODY MASS INDEX: 28.11 KG/M2 | WEIGHT: 152.75 LBS | OXYGEN SATURATION: 95 %

## 2024-12-10 DIAGNOSIS — E78.2 MIXED HYPERLIPIDEMIA: ICD-10-CM

## 2024-12-10 DIAGNOSIS — E55.9 VITAMIN D DEFICIENCY: ICD-10-CM

## 2024-12-10 DIAGNOSIS — Z85.820 PERSONAL HISTORY OF MALIGNANT MELANOMA OF SKIN: ICD-10-CM

## 2024-12-10 DIAGNOSIS — I10 PRIMARY HYPERTENSION: ICD-10-CM

## 2024-12-10 DIAGNOSIS — R73.03 PREDIABETES: ICD-10-CM

## 2024-12-10 DIAGNOSIS — Z23 NEED FOR VACCINATION: ICD-10-CM

## 2024-12-10 DIAGNOSIS — Z00.00 PREVENTATIVE HEALTH CARE: Primary | ICD-10-CM

## 2024-12-10 DIAGNOSIS — G43.909 MIGRAINE SYNDROME: ICD-10-CM

## 2024-12-10 DIAGNOSIS — F41.1 GAD (GENERALIZED ANXIETY DISORDER): ICD-10-CM

## 2024-12-10 PROBLEM — Z17.1 MALIGNANT NEOPLASM OF LOWER-OUTER QUADRANT OF RIGHT BREAST OF FEMALE, ESTROGEN RECEPTOR NEGATIVE: Status: RESOLVED | Noted: 2021-06-23 | Resolved: 2024-12-10

## 2024-12-10 PROBLEM — C43.61 MALIGNANT MELANOMA OF RIGHT UPPER EXTREMITY INCLUDING SHOULDER: Status: RESOLVED | Noted: 2021-06-23 | Resolved: 2024-12-10

## 2024-12-10 PROBLEM — C50.511 MALIGNANT NEOPLASM OF LOWER-OUTER QUADRANT OF RIGHT BREAST OF FEMALE, ESTROGEN RECEPTOR NEGATIVE: Status: RESOLVED | Noted: 2021-06-23 | Resolved: 2024-12-10

## 2024-12-10 PROBLEM — G31.84 MILD COGNITIVE IMPAIRMENT: Status: RESOLVED | Noted: 2023-07-11 | Resolved: 2024-12-10

## 2024-12-10 PROBLEM — N32.81 OVERACTIVE BLADDER: Status: RESOLVED | Noted: 2023-01-11 | Resolved: 2024-12-10

## 2024-12-10 PROCEDURE — 99999PBSHW FLU VACCINE - ADJUVANTED: Mod: PBBFAC,,,

## 2024-12-10 PROCEDURE — 99999 PR PBB SHADOW E&M-EST. PATIENT-LVL III: CPT | Mod: PBBFAC,,, | Performed by: INTERNAL MEDICINE

## 2024-12-10 PROCEDURE — 99213 OFFICE O/P EST LOW 20 MIN: CPT | Mod: PBBFAC,PN | Performed by: INTERNAL MEDICINE

## 2024-12-10 PROCEDURE — G0008 ADMIN INFLUENZA VIRUS VAC: HCPCS | Mod: PBBFAC,PN

## 2024-12-10 RX ORDER — OXYBUTYNIN CHLORIDE 10 MG/1
10 TABLET, EXTENDED RELEASE ORAL DAILY
COMMUNITY
Start: 2024-09-14 | End: 2024-12-10

## 2024-12-10 NOTE — PATIENT INSTRUCTIONS
Preventative health care  Assessment & Plan:  Flu vaccine today   Get RSV vaccine   Consider COVID   Get MMG and labs at [place] in 3/2025  Schedule derm     Orders:  -     Urinalysis; Future; Expected date: 12/10/2024  -     Comprehensive Metabolic Panel; Future; Expected date: 12/10/2024    Prediabetes  Assessment & Plan:  -Fasting Glucose ;  90 min after meals <140; bedtime 100-130  If glucose <100 at bedtime, eat small snack    Diet should be high in fiber with 30-35 gms daily, complex carbs, low saturated/trans fat diet,  Avoid fast foods, sweetened drinks, processed , white bread/pasta/rice/potatoes.  Hydate with 6-8 glasses of water daily.exercise 30 min 5 times per week      Never go barefeet, moisturize feet, avoid cutting toenails too deep  See podiatrist at least annually  See ophthalmology annually        Check Bps at home weekly and prn symptoms for goal BP <130/80.  -Start healthy diet high in fiber (25-35 gm daily), low fat dairy, lean protein, low in saturated/trans fat (minimize cheese, creamy salad dressings); high in calcium/magnesium/potassium; 1.5 gm sodium diet; low in processed sugars and foods, ie  WHITE sugars, bread, pasta, rice, ice cream, cookies, cake, doughnuts  -Drink 6-8 glasses of water daily  -Moderate exercise 30 min 5 times per week or 75 min intense exercise biweekly   -Start 8-10 hour intermittent fasting diet   -Avoid eating 3 hours prior to bedtime  -      Orders:  -     Hemoglobin A1C; Future; Expected date: 12/10/2024    Migraine syndrome  Assessment & Plan:  take Motrin 400mg with food then Maxalt prn severe headache  Lie in dark, quiet room         Personal history of malignant melanoma of skin  Assessment & Plan:  Refer to derm for surveillance    Orders:  -     Ambulatory referral/consult to Dermatology; Future; Expected date: 12/10/2024    NOHEMY (generalized anxiety disorder)  Assessment & Plan:  stable on Zoloft 100mg daily  Take buspar as needed only if  efficacious - if not then stop      Mixed hyperlipidemia  -     CBC Without Differential; Future; Expected date: 12/10/2024  -     Lipid Panel; Future; Expected date: 12/10/2024    Primary hypertension  Assessment & Plan:  Cont Toprol XL 25mg daily  -Check Bps at home weekly and prn symptoms for goal BP <130/80 and avoid <110/60; HR >55 bpm and <100bpm   -Start healthy diet high in fiber (25-35 gm daily), low fat dairy, lean protein, low in saturated/trans fat (minimize cheese, creamy salad dressings); high in calcium/magnesium/potassium; 1.5 gm sodium diet; low in processed sugars and foods, ie  WHITE sugars, bread, pasta, rice, ice cream, cookies, cake, doughnuts  -Drink 6-8 glasses of water daily  -Moderate exercise 30 min 5 times per week or 75 min intense exercise biweekly   -Start 8-10 hour intermittent fasting diet   -Avoid eating 3 hours prior to bedtime  -Reduce alcohol to 1-2 glasses per day  --Minimize NSAIDs        Orders:  -     Magnesium; Future; Expected date: 12/10/2024  -     Microalbumin/Creatinine Ratio, Urine; Future; Expected date: 12/10/2024    Vitamin D deficiency

## 2024-12-10 NOTE — ASSESSMENT & PLAN NOTE
-Fasting Glucose ;  90 min after meals <140; bedtime 100-130  If glucose <100 at bedtime, eat small snack    Diet should be high in fiber with 30-35 gms daily, complex carbs, low saturated/trans fat diet,  Avoid fast foods, sweetened drinks, processed , white bread/pasta/rice/potatoes.  Hydate with 6-8 glasses of water daily.exercise 30 min 5 times per week      Never go barefeet, moisturize feet, avoid cutting toenails too deep  See podiatrist at least annually  See ophthalmology annually        Check Bps at home weekly and prn symptoms for goal BP <130/80.  -Start healthy diet high in fiber (25-35 gm daily), low fat dairy, lean protein, low in saturated/trans fat (minimize cheese, creamy salad dressings); high in calcium/magnesium/potassium; 1.5 gm sodium diet; low in processed sugars and foods, ie  WHITE sugars, bread, pasta, rice, ice cream, cookies, cake, doughnuts  -Drink 6-8 glasses of water daily  -Moderate exercise 30 min 5 times per week or 75 min intense exercise biweekly   -Start 8-10 hour intermittent fasting diet   -Avoid eating 3 hours prior to bedtime  -

## 2024-12-10 NOTE — PROGRESS NOTES
[facility] Progress Note      Chief Complaint     Annual Exam       Subjective:      History of Present Illness:  Alyce Lewis is a 79 y.o. female     Short-term Memory Loss/Mild cognitive impairment: saw Neuropsych Dr. Pina and had normal MRI head with mild volume loss 2023; and labs   -sensorium clearer when she stopped flexeril x 3 mo     NOHEMY x 20 years: stable on Zoloft 100mg daily     Migraine H/A: takes Motrin 400mg with food then Maxalt prn     HLD: compliant with lipitor 40mg daily without side effects    HTN: well-controlled on Toprol XL 25mg daily; denies CP, SOB, FELIX    AR: flonase prn     PreDM: HbA1C 6.0 in 2023; pt did not realize she had issues with hyperglycemia    Hx of right breast cancer: lumpectomy  ;MMG due 3/2025     Melanoma prox forearm removed : needs derm screening    SH: walks on treadmill 20-30 min qod - listens to Chuy Heck; no tobacco, drugs, alcohol;  but estranged; has 1 son and 1 daughter; drives and lives independently does ADLs - drives, cooks; does not get lost ; picks up 15 y/o grandson from [place]; sister 62 y/o and daughter check in on her   FH: melanoma: sister and nephew; mom pancreatic cancer dx'ed at 89 and  at 93 y/o; dad  in 60s CAD;  7 siblings healthy - all sisters with NOHEMY - alive     Past Medical History:  Past Medical History:   Diagnosis Date    BRCA1 positive     Breast cancer     ; lumpectomy and radiation; dr. condon    Hx of cataract surgery 2021    Hyperlipidemia     Melanoma     on arm; right arm; dr. jennie nolasco    Migraine     uses maxalt disintegrating;     Mild cognitive impairment 2023    Overactive bladder 2023    Usual hyperplasia of lactiferous duct        Past Surgical History:  Past Surgical History:   Procedure Laterality Date    BREAST BIOPSY      BREAST LUMPECTOMY      carpel tunnel release      HAND SURGERY      melanoma removal      right arm    TONSILLECTOMY          Allergies:  Review of patient's allergies indicates:   Allergen Reactions    Penicillins Hives    Demerol [meperidine] Nausea And Vomiting    Sulfa (sulfonamide antibiotics) Hives       Home Medications:  Current Outpatient Medications   Medication Sig Dispense Refill    atorvastatin (LIPITOR) 40 MG tablet TAKE 1 TABLET BY MOUTH EVERY DAY 90 tablet 1    fluticasone propionate (FLONASE) 50 mcg/actuation nasal spray 2 SPRAYS (100 MCG TOTAL) BY EACH NOSTRIL ROUTE ONCE DAILY AT 6AM. 48 mL 3    metoprolol succinate (TOPROL-XL) 25 MG 24 hr tablet TAKE 1 TABLET BY MOUTH EVERY DAY 90 tablet 3    rizatriptan (MAXALT-MLT) 10 MG disintegrating tablet PLACE 1 TABLET ON THE TONGUE AND ALLOW TO DISSOLVE, MAY REPEAT IN 2 HOURS IF NEEDED 9 tablet 11    sertraline (ZOLOFT) 100 MG tablet Take 1 tablet (100 mg total) by mouth once daily. 90 tablet 2     No current facility-administered medications for this visit.        Family History:  Family History   Problem Relation Name Age of Onset    Heart attack Father      Pancreatic cancer Mother Alyce Lewis     Breast cancer Mother Alyce Lewis     Hypertension Maternal Grandmother      Prostate cancer Brother      Melanoma Sister      Hypertension Sister      Hypertension Sister         Social History:  Social History     Tobacco Use    Smoking status: Never    Smokeless tobacco: Never   Substance Use Topics    Alcohol use: No    Drug use: No       Review of Systems:  Pertinent positives and negatives listed in HPI. All other systems are reviewed and are negative.    Health Maintaince :   Health Maintenance Topics with due status: Not Due       Topic Last Completion Date    Colonoscopy 11/27/2018    TETANUS VACCINE 06/23/2021    Hemoglobin A1c (Prediabetes) 03/11/2024    Lipid Panel 03/11/2024    Mammogram 03/14/2024    DEXA Scan 03/26/2024           Eye: UTD cataracts OU 2010  Dental: UTD    Immunizations:   Tdap: n/a.  Influenza: today .  Pneumovax: UTD   Shingrex x 2: UTD  COVID:   "needs  Hepatitis C:   Cancer Screening:  PAP: UTD normal GYN exam with Dr. Wiedemann 1/31/2023  Mammogram: UTD due March 3/15/2025 at [facility]   Colonoscopy: good until 11/2028   DEXA:  normal 3/2024  LDCT:     The 10-year ASCVD risk score (Sarah ISLAS, et al., 2019) is: 26.3%    Values used to calculate the score:      Age: 79 years      Sex: Female      Is Non- : No      Diabetic: No      Tobacco smoker: No      Systolic Blood Pressure: 124 mmHg      Is BP treated: Yes      HDL Cholesterol: 47 mg/dL      Total Cholesterol: 192 mg/dL      Objective:   /80 (BP Location: Left arm, Patient Position: Sitting)   Pulse 84   Ht 5' 2" (1.575 m)   Wt 69.3 kg (152 lb 12.5 oz)   SpO2 95%   BMI 27.94 kg/m²      Body mass index is 27.94 kg/m².       Physical Examination:  General: Alert and awake in no apparent distress  HEENT: Normocephalic and atraumatic; Tms WNL  Eyes:  PERRL; EOMi with anicteric sclera and clear conjunctivae  Mouth:  Oropharynx clear and without exudate; moist mucous membranes  Neck:   Cervical nodes not enlarged; supple; no bruits  Cardio:  Regular rate and rhythm with normal S1 and S2; no murmurs or rubs  Resp:  CTAB; respirations unlabored; no wheezes, crackles or rhonchi  Abdom: Soft, NTND with normoactive bowel sounds; negative HSM  Extrem: Warm and well-perfused with no clubbing, cyanosis or edema  Skin:  No rashes, lesions, or color changes  Pulses:  2+ and symmetric distally  Neuro:  AAOx3; cooperative and pleasant with no focal deficits    Laboratory:      Most Recent Data:  Lab Results   Component Value Date    WBC 6.33 03/11/2024    HGB 13.5 03/11/2024    HCT 41.0 03/11/2024     03/11/2024    CHOL 192 03/11/2024    TRIG 90 03/11/2024    HDL 47 03/11/2024    ALT 19 03/11/2024    AST 20 03/11/2024     03/11/2024    K 4.1 03/11/2024     03/11/2024    BUN 15 03/11/2024    CO2 27 03/11/2024    TSH 1.307 01/11/2023    HGBA1C 5.8 (H) 03/11/2024      "         CBC:   WBC   Date Value Ref Range Status   03/11/2024 6.33 3.90 - 12.70 K/uL Final     Hemoglobin   Date Value Ref Range Status   03/11/2024 13.5 12.0 - 16.0 g/dL Final     Hematocrit   Date Value Ref Range Status   03/11/2024 41.0 37.0 - 48.5 % Final     Platelets   Date Value Ref Range Status   03/11/2024 271 150 - 450 K/uL Final     MCV   Date Value Ref Range Status   03/11/2024 92 82 - 98 fL Final     RDW   Date Value Ref Range Status   03/11/2024 13.7 11.5 - 14.5 % Final     BMP:   Sodium   Date Value Ref Range Status   03/11/2024 140 136 - 145 mmol/L Final     Potassium   Date Value Ref Range Status   03/11/2024 4.1 3.5 - 5.1 mmol/L Final     Chloride   Date Value Ref Range Status   03/11/2024 105 95 - 110 mmol/L Final     CO2   Date Value Ref Range Status   03/11/2024 27 23 - 29 mmol/L Final     BUN   Date Value Ref Range Status   03/11/2024 15 8 - 23 mg/dL Final     Creatinine   Date Value Ref Range Status   03/11/2024 0.7 0.5 - 1.4 mg/dL Final     Glucose   Date Value Ref Range Status   03/11/2024 92 70 - 110 mg/dL Final     Calcium   Date Value Ref Range Status   03/11/2024 10.2 8.7 - 10.5 mg/dL Final     Magnesium   Date Value Ref Range Status   03/11/2024 2.0 1.6 - 2.6 mg/dL Final     LFTs:   Total Protein   Date Value Ref Range Status   03/11/2024 7.0 6.0 - 8.4 g/dL Final     Albumin   Date Value Ref Range Status   03/11/2024 3.9 3.5 - 5.2 g/dL Final     Total Bilirubin   Date Value Ref Range Status   03/11/2024 0.4 0.1 - 1.0 mg/dL Final     Comment:     For infants and newborns, interpretation of results should be based  on gestational age, weight and in agreement with clinical  observations.    Premature Infant recommended reference ranges:  Up to 24 hours.............<8.0 mg/dL  Up to 48 hours............<12.0 mg/dL  3-5 days..................<15.0 mg/dL  6-29 days.................<15.0 mg/dL       AST   Date Value Ref Range Status   03/11/2024 20 10 - 40 U/L Final     Alkaline Phosphatase  "  Date Value Ref Range Status   03/11/2024 77 55 - 135 U/L Final     ALT   Date Value Ref Range Status   03/11/2024 19 10 - 44 U/L Final     Coags: No results found for: "INR", "PROTIME", "PTT"  FLP:      Lab Results   Component Value Date    CHOL 192 03/11/2024    CHOL 184 01/11/2023    CHOL 186 03/18/2022     Lab Results   Component Value Date    HDL 47 03/11/2024    HDL 57 01/11/2023    HDL 53 03/18/2022     Lab Results   Component Value Date    LDLCALC 127.0 03/11/2024    LDLCALC 112.4 01/11/2023    LDLCALC 115.0 03/18/2022     Lab Results   Component Value Date    TRIG 90 03/11/2024    TRIG 73 01/11/2023    TRIG 90 03/18/2022     Lab Results   Component Value Date    CHOLHDL 24.5 03/11/2024    CHOLHDL 31.0 01/11/2023    CHOLHDL 28.5 03/18/2022      DM:      Hemoglobin A1C   Date Value Ref Range Status   03/11/2024 5.8 (H) 4.0 - 5.6 % Final     Comment:     ADA Screening Guidelines:  5.7-6.4%  Consistent with prediabetes  >or=6.5%  Consistent with diabetes    High levels of fetal hemoglobin interfere with the HbA1C  assay. Heterozygous hemoglobin variants (HbS, HgC, etc)do  not significantly interfere with this assay.   However, presence of multiple variants may affect accuracy.     01/11/2023 6.0 (H) 4.0 - 5.6 % Final     Comment:     ADA Screening Guidelines:  5.7-6.4%  Consistent with prediabetes  >or=6.5%  Consistent with diabetes    High levels of fetal hemoglobin interfere with the HbA1C  assay. Heterozygous hemoglobin variants (HbS, HgC, etc)do  not significantly interfere with this assay.   However, presence of multiple variants may affect accuracy.     03/18/2022 5.8 (H) 4.0 - 5.6 % Final     Comment:     ADA Screening Guidelines:  5.7-6.4%  Consistent with prediabetes  >or=6.5%  Consistent with diabetes    High levels of fetal hemoglobin interfere with the HbA1C  assay. Heterozygous hemoglobin variants (HbS, HgC, etc)do  not significantly interfere with this assay.   However, presence of multiple " "variants may affect accuracy.       LDL Cholesterol   Date Value Ref Range Status   03/11/2024 127.0 63.0 - 159.0 mg/dL Final     Comment:     The National Cholesterol Education Program (NCEP) has set the  following guidelines (reference values) for LDL Cholesterol:  Optimal.......................<130 mg/dL  Borderline High...............130-159 mg/dL  High..........................160-189 mg/dL  Very High.....................>190 mg/dL       Creatinine   Date Value Ref Range Status   03/11/2024 0.7 0.5 - 1.4 mg/dL Final     Thyroid:   TSH   Date Value Ref Range Status   01/11/2023 1.307 0.400 - 4.000 uIU/mL Final     Free T4   Date Value Ref Range Status   01/11/2023 0.96 0.71 - 1.51 ng/dL Final     Anemia:   Vitamin B-12   Date Value Ref Range Status   07/14/2023 783 210 - 950 pg/mL Final     Folate   Date Value Ref Range Status   07/14/2023 8.4 4.0 - 24.0 ng/mL Final     Cardiac: No results found for: "TROPONINI", "CKTOTAL", "CKMB", "BNP"  Urinalysis:   Color, UA   Date Value Ref Range Status   03/11/2024 Yellow Yellow, Straw, January Final     Specific Gravity, UA   Date Value Ref Range Status   03/11/2024 1.020 1.005 - 1.030 Final     Nitrite, UA   Date Value Ref Range Status   03/11/2024 Negative Negative Final     Ketones, UA   Date Value Ref Range Status   03/11/2024 Negative Negative Final     Urobilinogen, UA   Date Value Ref Range Status   03/03/2018 Negative <2.0 EU/dL Final     WBC, UA   Date Value Ref Range Status   03/11/2024 2 0 - 5 /hpf Final       Other Results:  EKG (my interpretation):     Radiology:  DXA Bone Density Axial Skeleton 1 or more sites  Narrative: EXAMINATION:  DXA BONE DENSITY AXIAL SKELETON 1 OR MORE SITES    CLINICAL HISTORY:  Asymptomatic menopausal state.  77 y/o female with no history of fractures.  She had a menopausal symptoms at 47 y/o.  She has a history of Radiation Treatment, Breast Ca and Skin Ca.  She exercise regularly and does not smoke.    TECHNIQUE:  DXA " specification: [facility] [employer] A (S/N [id number])    Bone Mineral Density scanning was performed over the hip and lumbar spine.    Review of the images confirms satisfactory positioning and technique.    COMPARISON:  Comparison study done on 06/21/2021.    Lumbar spine:     BMD 1.100 g/cm2 and T-score 0.5.    Total Hip:           BMD 0.958 g/cm2 and T-score 0.1.    Distal 1/3 radius: Not applicable    FINDINGS:  Lumbar spine (L1-L4):               BMD is 1.030 g/cm2, T-score is -0.2, and Z-score is 2.4.    Total hip:                                BMD is 0.949 g/cm2, T-score is 0.1, and Z-score is 2.0.    Femoral neck:                          BMD is 0.768 g/cm2, T-score is -0.7, and Z-score is 1.5.    Distal 1/3 radius:                      Not applicable    FRAX:    9.9% risk of a major osteoporotic fracture in the next 10 years.    1.5% risk of hip fracture in the next 10 years.  Impression: *Normal bone mineral density  *Fracture risk is moderate  *Compared with previous DXA, BMD at the lumbar spine has declined by 6.3%, and BMD at the total hip has remained stable.    RECOMMENDATIONS:  *Daily calcium intake 1200 mg, dietary sources preferred; Vitamin D 800 IU daily.  *Weight bearing exercise and fall precautions.  *No additional pharmacologic therapy recommended at this time.  *Repeat BMD in 2 years    EXPLANATION OF RESULTS:  T-score compares these results to the average bone density of a 20-29 year-old of the same gender.    Z-score compares this result to the average bone density to people of the same age, gender, and race.    The amounts indicate the number of standard deviations above or below the mean.    * Osteoporosis is generally defined as having a T-score between less than or equal to -2.5.    * Low bone mass (osteopenia) is generally defined as having a T-score between -1.0 and -2.5.    * The normal range is generally defined as having a T-score greater than or equal to -1.0.    * Calculated  FRAX scores for fracture risk prediction may not be accurate in the setting of certain clinical factors such as pharmacologic therapy for osteoporosis, prior fragility fractures, high dose glucocorticoid use.    Electronically signed by: Andres Armstrong  Date:    03/27/2024  Time:    14:35          Assessment/Plan     Alyce Lewis is a 79 y.o. female with:  1. Preventative health care  Assessment & Plan:  Flu vaccine today   Get RSV vaccine   Consider COVID   Get MMG and labs at [place] in 3/2025  Schedule derm     Orders:  -     Urinalysis; Future; Expected date: 12/10/2024  -     Comprehensive Metabolic Panel; Future; Expected date: 12/10/2024    2. Prediabetes  Assessment & Plan:  -Fasting Glucose ;  90 min after meals <140; bedtime 100-130  If glucose <100 at bedtime, eat small snack    Diet should be high in fiber with 30-35 gms daily, complex carbs, low saturated/trans fat diet,  Avoid fast foods, sweetened drinks, processed , white bread/pasta/rice/potatoes.  Hydate with 6-8 glasses of water daily.exercise 30 min 5 times per week      Never go barefeet, moisturize feet, avoid cutting toenails too deep  See podiatrist at least annually  See ophthalmology annually        Check Bps at home weekly and prn symptoms for goal BP <130/80.  -Start healthy diet high in fiber (25-35 gm daily), low fat dairy, lean protein, low in saturated/trans fat (minimize cheese, creamy salad dressings); high in calcium/magnesium/potassium; 1.5 gm sodium diet; low in processed sugars and foods, ie  WHITE sugars, bread, pasta, rice, ice cream, cookies, cake, doughnuts  -Drink 6-8 glasses of water daily  -Moderate exercise 30 min 5 times per week or 75 min intense exercise biweekly   -Start 8-10 hour intermittent fasting diet   -Avoid eating 3 hours prior to bedtime  -      Orders:  -     Hemoglobin A1C; Future; Expected date: 12/10/2024    3. Migraine syndrome  Assessment & Plan:  take Motrin 400mg with food then Maxalt prn  severe headache  Lie in dark, quiet room         4. Personal history of malignant melanoma of skin  Assessment & Plan:  Refer to derm for surveillance    Orders:  -     Ambulatory referral/consult to Dermatology; Future; Expected date: 12/10/2024    5. NOHEMY (generalized anxiety disorder)  Overview:  X 20 years or greater    Assessment & Plan:  stable on Zoloft 100mg daily  Take buspar as needed only if efficacious - if not then stop      6. Mixed hyperlipidemia  -     CBC Without Differential; Future; Expected date: 12/10/2024  -     Lipid Panel; Future; Expected date: 12/10/2024    7. Primary hypertension  Assessment & Plan:  Cont Toprol XL 25mg daily  -Check Bps at home weekly and prn symptoms for goal BP <130/80 and avoid <110/60; HR >55 bpm and <100bpm   -Start healthy diet high in fiber (25-35 gm daily), low fat dairy, lean protein, low in saturated/trans fat (minimize cheese, creamy salad dressings); high in calcium/magnesium/potassium; 1.5 gm sodium diet; low in processed sugars and foods, ie  WHITE sugars, bread, pasta, rice, ice cream, cookies, cake, doughnuts  -Drink 6-8 glasses of water daily  -Moderate exercise 30 min 5 times per week or 75 min intense exercise biweekly   -Start 8-10 hour intermittent fasting diet   -Avoid eating 3 hours prior to bedtime  -Reduce alcohol to 1-2 glasses per day  --Minimize NSAIDs        Orders:  -     Magnesium; Future; Expected date: 12/10/2024  -     Microalbumin/Creatinine Ratio, Urine; Future; Expected date: 12/10/2024    8. Vitamin D deficiency               I spent a total of 66 minutes on the day of the visit.This includes face to face time and non-face to face time preparing to see the patient (eg, review of tests), obtaining and/or reviewing separately obtained history, documenting clinical information in the electronic or other health record, independently interpreting results and communicating results to the patient/family/caregiver, or care coordinator.   Code  Status:     Elvi Mancuso MD

## 2024-12-10 NOTE — ASSESSMENT & PLAN NOTE
Flu vaccine today   Get RSV vaccine   Consider COVID   Get MMG and labs at [place] in 3/2025  Schedule derm

## 2024-12-14 DIAGNOSIS — G43.909 MIGRAINE SYNDROME: ICD-10-CM

## 2024-12-14 NOTE — TELEPHONE ENCOUNTER
Care Due:                  Date            Visit Type   Department     Provider  --------------------------------------------------------------------------------                                EP -                              PRIMARY      [facility]  Last Visit: 12-      CARE ([facility])   Care           Elvi Mancuso  Next Visit: None Scheduled  None         None Found                                                            Last  Test          Frequency    Reason                     Performed    Due Date  --------------------------------------------------------------------------------    CMP.........  12 months..  atorvastatin.............  03- 03-    Lipid Panel.  12 months..  atorvastatin.............  03- 03-    [employer] Embedded Care Due Messages. Reference number: [id number].   12/14/2024 7:25:47 AM CST

## 2024-12-15 RX ORDER — RIZATRIPTAN BENZOATE 10 MG/1
TABLET, ORALLY DISINTEGRATING ORAL
Qty: 9 TABLET | Refills: 11 | Status: SHIPPED | OUTPATIENT
Start: 2024-12-15

## 2024-12-15 NOTE — TELEPHONE ENCOUNTER
Refill Decision Note   Alyce Sethikristiaureliano  is requesting a refill authorization.  Brief Assessment and Rationale for Refill:  Approve     Medication Therapy Plan:  FLOS      Comments:     Note composed:4:21 PM 12/15/2024             Appointments     Last Visit   12/10/2024 Elvi Mancuso MD   Next Visit   Visit date not found Elvi Mancuso MD

## 2024-12-20 RX ORDER — SERTRALINE HYDROCHLORIDE 100 MG/1
100 TABLET, FILM COATED ORAL
Qty: 90 TABLET | Refills: 3 | Status: SHIPPED | OUTPATIENT
Start: 2024-12-20

## 2024-12-20 NOTE — TELEPHONE ENCOUNTER
No care due was identified.  [facility] Embedded Care Due Messages. Reference number: [id number].   12/20/2024 8:56:47 AM CST

## 2024-12-20 NOTE — TELEPHONE ENCOUNTER
Refill Decision Note   Alyce Lewis  is requesting a refill authorization.  Brief Assessment and Rationale for Refill:  Approve     Medication Therapy Plan:         Comments:     Note composed:11:19 AM 12/20/2024

## 2025-01-06 ENCOUNTER — PATIENT MESSAGE (OUTPATIENT)
Dept: PRIMARY CARE CLINIC | Facility: CLINIC | Age: 80
End: 2025-01-06
Payer: MEDICARE

## 2025-01-06 DIAGNOSIS — R29.898 LEG WEAKNESS, BILATERAL: Primary | ICD-10-CM

## 2025-01-06 NOTE — TELEPHONE ENCOUNTER
LOV with Lo, Elvi Velasquez MD , 12/10/2024  Pt states that you recommended Pt at her last visit to [facility] with strength and mobiltiy. I pended orders for review.  Unsure of what diagnose to link to this referral

## 2025-01-19 NOTE — TELEPHONE ENCOUNTER
No care due was identified.  [facility] Embedded Care Due Messages. Reference number: [id number].   1/19/2025 10:08:57 AM CST

## 2025-01-21 RX ORDER — METOPROLOL SUCCINATE 25 MG/1
25 TABLET, EXTENDED RELEASE ORAL
Qty: 90 TABLET | Refills: 3 | Status: SHIPPED | OUTPATIENT
Start: 2025-01-21

## 2025-01-21 NOTE — TELEPHONE ENCOUNTER
Refill Decision Note   Alyce Lewis  is requesting a refill authorization.  Brief Assessment and Rationale for Refill:  Approve     Medication Therapy Plan:         Comments:     Note composed:8:33 AM 01/21/2025

## 2025-01-29 ENCOUNTER — CLINICAL SUPPORT (OUTPATIENT)
Dept: REHABILITATION | Facility: HOSPITAL | Age: 80
End: 2025-01-29
Payer: MEDICARE

## 2025-01-29 DIAGNOSIS — R29.898 LEG WEAKNESS, BILATERAL: ICD-10-CM

## 2025-01-29 PROCEDURE — 97530 THERAPEUTIC ACTIVITIES: CPT

## 2025-01-29 PROCEDURE — 97161 PT EVAL LOW COMPLEX 20 MIN: CPT

## 2025-01-29 NOTE — PROGRESS NOTES
Outpatient Rehab    Physical Therapy Evaluation    Patient Name: Alyce Lewis  MRN: [id number]  YOB: 1945  Today's Date: 1/31/2025    Therapy Diagnosis:   Encounter Diagnosis   Name Primary?    Leg weakness, bilateral      Physician: Elvi Mancuso MD    Physician Orders: Eval and Treat  Medical Diagnosis:  Leg weakness, bilateral [R29.898]     Visit # / Visits Authorized:  1 / 1   Date of Evaluation:  1/29/2025   Insurance Authorization Period: 1/6/2025 to 1/6/2026  Plan of Care Certification:  1/29/2025 to 4/11/2025      Time In: 1010   Time Out: 1055  Total Time: 45   Total Billable Time: 45       Subjective   History of Present Illness  Alyce is a 79 y.o. female who reports to physical therapy with a chief concern of general lower extremity weakness. According to the patient's chart, Alyce has a past medical history of BRCA1 positive, Breast cancer, cataract surgery, Hyperlipidemia, Melanoma, Migraine, Mild cognitive impairment, Overactive bladder, and Usual hyperplasia of lactiferous duct. Alyce has a past surgical history that includes Breast lumpectomy; melanoma removal; Tonsillectomy; carpel tunnel release; Hand surgery; and Breast biopsy.                History of Present Condition/Illness: Patient states she is attending therapy evaluation because she is hoping to improve her general lower extremity strengthening and reduce her risk of falling or becoming weaker. She states no current aches or pains in her normal daily routine and states she is active on a weekly/daily basis. States she has a history of breast cancer and melanoma that are both not of current concern to her or her oncologist. States she has recently been advised and urged by her daughter to attend therapy in order to strengthen her lower extremities.     Activities of Daily Living  Social history was obtained from Patient.               Previously independent with activities of daily living? Yes     Currently independent with  activities of daily living? Yes          Previously independent with instrumental activities of daily living? Yes     Currently independent with instrumental activities of daily living? Yes              Pain  No Pain Reported: Yes       Clinical Progression (since onset): Stable         Review of Systems  Patient denies: Bladder Incontinence, Bowel Incontinence, Chest Pain, Night Pain, Sleep Disturbance, and Weight Loss        Treatment History  Treatments  Previously Received Treatments: No    Living Arrangements  Living Situation  Housing: Home independently  Living Arrangements: Alone    Home Setup  Type of Structure: House          Past Medical History/Physical Systems Review:   Alyce Lewis  has a past medical history of BRCA1 positive, Breast cancer, cataract surgery, Hyperlipidemia, Melanoma, Migraine, Mild cognitive impairment, Overactive bladder, and Usual hyperplasia of lactiferous duct.    Alyce Lewis  has a past surgical history that includes Breast lumpectomy; melanoma removal; Tonsillectomy; carpel tunnel release; Hand surgery; and Breast biopsy.    Alyce has a current medication list which includes the following prescription(s): atorvastatin, fluticasone propionate, metoprolol succinate, rizatriptan, and sertraline.    Review of patient's allergies indicates:   Allergen Reactions    Penicillins Hives    Demerol [meperidine] Nausea And Vomiting    Sulfa (sulfonamide antibiotics) Hives        Objective      Lower Extremity Sensation  General Lumbar/Lower Extremity Sensation  Intact: Right and Left  Right Lumbar/Lower Extremity Sensation  Intact: Light Touch, Sharp/Dull Discrimination, Static Two Point Discrimination, Dynamic Two Point Discrimination, Kinesthesia, and Proprioception  Right Lumbar/Lower Extremity Sensation Stocking Glove Pattern: No    Left Lumbar/Lower Extremity Sensation  Intact: Light Touch, Static Two Point Discrimination, Dynamic Two Point Discrimination, Sharp/Dull Discrimination,  Kinesthesia, and Proprioception  Left Lumbar/Lower Extremity Sensation Stocking Glove Pattern: No              Hip Range of Motion   Right Hip   Active (deg) Passive (deg) Pain   Flexion 95       Extension 0       ABduction 30       ADduction         External Rotation 90/90 45       External Rotation Prone 25       Internal Rotation 90/90         Internal Rotation Prone             Left Hip   Active (deg) Passive (deg) Pain   Flexion 95       Extension 0       ABduction 30       ADduction         External Rotation 90/90 45       External Rotation Prone 25       Internal Rotation 90/90         Internal Rotation Prone                  Knee Range of Motion   Right Knee   Active (deg) Passive (deg) Pain   Flexion 125       Extension 0           Left Knee   Active (deg) Passive (deg) Pain   Flexion 125       Extension 0                          Hip Strength - Planes of Motion   Right Strength Right Pain Left Strength Left  Pain   Flexion (L2) 4-   4-     Extension 3+   3+     ABduction 3   3     ADduction           Internal Rotation           External Rotation               Knee Strength   Right Strength Right Pain Left Strength Left  Pain   Flexion (S2) 4-   4-     Prone Flexion           Extension (L3) 4-   4-            Ankle/Foot Strength - Planes of Motion   Right Strength Right Pain Left Strength Left  Pain   Dorsiflexion (L4) 4   4     Plantar Flexion (S1) 4   4     Inversion           Eversion           Great Toe Flexion           Great Toe Extension (L5) 4   4     Lesser Toes Flexion           Lesser Toes Extension                  Transfers Assessment  Sit to Stand Assistance: Independent  Sit to Stand Assistance Details: 5x STS test 12.9 seconds      Fall Risk  Functional mobility test results suggest the patient is: At Risk for Falls  Four Stage Balance Test                 Single Leg Stand - Right Foot: 4 sec  Single Leg Stand - Left Foot: 4 sec           Ambulation Assistance Required  Surface  With  Assistive Device Without Assistive Device Details   Level   Independent      Uneven         Curb           Gait Analysis  Base of Support: Normal                   Intake Outcome Measure for FOTO Survey    Therapist reviewed FOTO scores for Alyce Lewis on 1/29/2025.   FOTO report - see Media section or FOTO account episode details.     Intake Score: 57%    Treatment:  Therapeutic Activity  Therapeutic Activity 1: HEP: Mini squats, LAQs, Lateral stepping RTB above ankles    Patient's spiritual, cultural, and educational needs considered and patient agreeable to plan of care and goals.     Assessment & Plan   Assessment  Alyce presents with a condition of Low complexity.   Presentation of Symptoms: Stable       Functional Limitations: Increased risk of fall, Ambulating on uneven surfaces    Patient Goal for Therapy (PT): be able to strengthen lower extremities and improve her muscular strength globally  Prognosis: Good  Assessment Details: Patient presents to therapy for general lower extremity strengthening and falls risk assessment. She demonstrates no deficits today with gait and ambulates independently. She conducted a 5x Sit to Stand test within normal ranges for her age group and shows quality global lower extremity strength. She has inability to hold static standing single leg balance without need for upper extremity assist for longer than 10 seconds which does place her at a slight risk of falls. Patient demonstrates good understanding and performance of home exercise program to initiate strengthening and lower extremity endurance to help facilitate improved ease with ADLs and prevent normal aging muscle loss. Patient will benefit from skilled therapy and customized plan of care to help reach her goals and reduce risk of falls.     Plan  From a physical therapy perspective, the patient would benefit from: Skilled Rehab Services    Planned therapy interventions include: Therapeutic activities, Therapeutic  exercise, Neuromuscular re-education, Manual therapy, and ADLs/IADLs.            Visit Frequency: 1 times Per Week for 10 Weeks.       This plan was discussed with Patient.   Discussion participants: Agreed Upon Plan of Care  Plan details: General lower extremity strength training and reducing risk of falls          Goals:   Active       Physical Therapy       Physical Therapy Goal (Progressing)       Start:  01/31/25    Expected End:  04/11/25       GOALS: Short Term Goals:  4 weeks  1.Report increased tolerance to daily walking routine to show compliance with HEP and activity routine.   2. Increase ability to conduct SL stance for > 10 seconds with use on one upper extremity to show improved balance ability and reduced risk of falls.    3. Increase strength by 1/3 MMT grade in bilateral lower extremities to increase tolerance for ADL and work activities.  4. Pt to tolerate HEP to improve ROM and independence with ADL's    Long Term Goals: 10 weeks  1.Report increased ability to walk one mile without endurance or muscle fatigue to show increase tolerance for daily ambulation bouts.   2.Patient goal: improve lower extremity strength   3.Increase strength to >/= 4+/5 in bilateral lower extremities to increase tolerance for ADL and work activities.  4. Pt will report at >/= 76% on FOTO knee to demonstrate increase in LE function with every day tasks.    5. Patient will demonstrate 5x STS test performance in less than 12 seconds to show improvement in lower extremity functional strength and decreased risk of falls.

## 2025-02-22 DIAGNOSIS — Z00.00 ENCOUNTER FOR MEDICARE ANNUAL WELLNESS EXAM: ICD-10-CM

## 2025-03-05 NOTE — TELEPHONE ENCOUNTER
Care Due:                  Date            Visit Type   Department     Provider  --------------------------------------------------------------------------------                                EP -                              PRIMARY      [facility]  Last Visit: 12-      CARE ([facility])   Care           Elvi Mancuso  Next Visit: None Scheduled  None         None Found                                                            Last  Test          Frequency    Reason                     Performed    Due Date  --------------------------------------------------------------------------------    CMP.........  12 months..  atorvastatin.............  03- 03-    Lipid Panel.  12 months..  atorvastatin.............  03- 03-    [employer] Embedded Care Due Messages. Reference number: [id number].   3/05/2025 1:01:23 PM CST

## 2025-03-06 RX ORDER — OXYBUTYNIN CHLORIDE 10 MG/1
10 TABLET, EXTENDED RELEASE ORAL
Qty: 90 TABLET | Refills: 1 | OUTPATIENT
Start: 2025-03-06

## 2025-03-06 NOTE — TELEPHONE ENCOUNTER
Refill Decision Note   Alyce Lewis  is requesting a refill authorization.  Brief Assessment and Rationale for Refill:  Quick Discontinue     Medication Therapy Plan:  Med d/c by Jana Larson MA on 12/10/2024; [facility]; [facility] 03/17/25      Comments:     Note composed:5:41 AM 03/06/2025

## 2025-03-17 ENCOUNTER — HOSPITAL ENCOUNTER (OUTPATIENT)
Dept: RADIOLOGY | Facility: HOSPITAL | Age: 80
Discharge: HOME | End: 2025-03-17
Attending: INTERNAL MEDICINE
Payer: MEDICARE

## 2025-03-17 ENCOUNTER — TELEPHONE (OUTPATIENT)
Dept: PRIMARY CARE CLINIC | Facility: CLINIC | Age: 80
End: 2025-03-17
Payer: MEDICARE

## 2025-03-17 ENCOUNTER — RESULTS FOLLOW-UP (OUTPATIENT)
Dept: PRIMARY CARE CLINIC | Facility: CLINIC | Age: 80
End: 2025-03-17

## 2025-03-17 VITALS — WEIGHT: 152 LBS | BODY MASS INDEX: 27.97 KG/M2 | HEIGHT: 62 IN

## 2025-03-17 DIAGNOSIS — R80.9 MICROALBUMINURIA: Primary | ICD-10-CM

## 2025-03-17 DIAGNOSIS — Z12.31 ENCOUNTER FOR SCREENING MAMMOGRAM FOR BREAST CANCER: ICD-10-CM

## 2025-03-17 PROCEDURE — 77067 SCR MAMMO BI INCL CAD: CPT | Mod: TC

## 2025-03-17 PROCEDURE — 77063 BREAST TOMOSYNTHESIS BI: CPT | Mod: 26,,, | Performed by: RADIOLOGY

## 2025-03-17 PROCEDURE — 77067 SCR MAMMO BI INCL CAD: CPT | Mod: 26,,, | Performed by: RADIOLOGY

## 2025-03-18 ENCOUNTER — RESULTS FOLLOW-UP (OUTPATIENT)
Dept: PRIMARY CARE CLINIC | Facility: CLINIC | Age: 80
End: 2025-03-18

## 2025-03-18 NOTE — TELEPHONE ENCOUNTER
Please schedule urine tests in 8 wks - thanks!  Please advise pt to hydrate well with water and submit a morning avoid; avoid exertional exercise the day prior.   Thanks!

## 2025-05-06 ENCOUNTER — TELEPHONE (OUTPATIENT)
Dept: DERMATOLOGY | Facility: CLINIC | Age: 80
End: 2025-05-06
Payer: MEDICARE

## 2025-06-11 DIAGNOSIS — E78.2 MIXED HYPERLIPIDEMIA: ICD-10-CM

## 2025-06-11 RX ORDER — ATORVASTATIN CALCIUM 40 MG/1
40 TABLET, FILM COATED ORAL DAILY
Qty: 90 TABLET | Refills: 1 | Status: SHIPPED | OUTPATIENT
Start: 2025-06-11

## 2025-06-11 NOTE — TELEPHONE ENCOUNTER
No care due was identified.  [facility] Embedded Care Due Messages. Reference number: [id number].   6/11/2025 12:53:27 PM CDT

## 2025-06-11 NOTE — TELEPHONE ENCOUNTER
No care due was identified.  [facility] Embedded Care Due Messages. Reference number: [id number].   6/11/2025 1:37:25 PM CDT

## 2025-08-29 ENCOUNTER — TELEPHONE (OUTPATIENT)
Dept: NEUROLOGY | Facility: CLINIC | Age: 80
End: 2025-08-29
Payer: MEDICARE